# Patient Record
Sex: FEMALE | Race: BLACK OR AFRICAN AMERICAN | NOT HISPANIC OR LATINO | Employment: OTHER | URBAN - METROPOLITAN AREA
[De-identification: names, ages, dates, MRNs, and addresses within clinical notes are randomized per-mention and may not be internally consistent; named-entity substitution may affect disease eponyms.]

---

## 2017-01-25 ENCOUNTER — HISTORICAL (OUTPATIENT)
Dept: LAB | Facility: HOSPITAL | Age: 77
End: 2017-01-25

## 2017-01-25 ENCOUNTER — HISTORICAL (OUTPATIENT)
Dept: PREADMISSION TESTING | Facility: HOSPITAL | Age: 77
End: 2017-01-25

## 2018-05-16 ENCOUNTER — HISTORICAL (OUTPATIENT)
Dept: RADIOLOGY | Facility: HOSPITAL | Age: 78
End: 2018-05-16

## 2018-05-16 LAB
ALBUMIN SERPL-MCNC: 3.6 GM/DL (ref 3.4–5)
ALBUMIN/GLOB SERPL: 0.9 RATIO (ref 1.1–2)
ALP SERPL-CCNC: 50 UNIT/L (ref 38–126)
ALT SERPL-CCNC: 16 UNIT/L (ref 12–78)
AST SERPL-CCNC: 16 UNIT/L (ref 15–37)
BILIRUB SERPL-MCNC: 0.6 MG/DL (ref 0.2–1)
BILIRUBIN DIRECT+TOT PNL SERPL-MCNC: 0.1 MG/DL (ref 0–0.5)
BILIRUBIN DIRECT+TOT PNL SERPL-MCNC: 0.5 MG/DL (ref 0–0.8)
BUN SERPL-MCNC: 19 MG/DL (ref 7–18)
CALCIUM SERPL-MCNC: 9.7 MG/DL (ref 8.5–10.1)
CHLORIDE SERPL-SCNC: 108 MMOL/L (ref 98–107)
CO2 SERPL-SCNC: 27 MMOL/L (ref 21–32)
CREAT SERPL-MCNC: 1.27 MG/DL (ref 0.55–1.02)
GLOBULIN SER-MCNC: 3.8 GM/DL (ref 2.4–3.5)
GLUCOSE SERPL-MCNC: 83 MG/DL (ref 74–106)
PHOSPHATE SERPL-MCNC: 2.9 MG/DL (ref 2.5–4.9)
POTASSIUM SERPL-SCNC: 4.2 MMOL/L (ref 3.5–5.1)
PROT SERPL-MCNC: 7.4 GM/DL (ref 6.4–8.2)
SODIUM SERPL-SCNC: 141 MMOL/L (ref 136–145)
URATE SERPL-MCNC: 7.7 MG/DL (ref 2.6–7.2)

## 2018-11-30 ENCOUNTER — HISTORICAL (OUTPATIENT)
Dept: ADMINISTRATIVE | Facility: HOSPITAL | Age: 78
End: 2018-11-30

## 2018-11-30 LAB
ALBUMIN SERPL-MCNC: 3.3 GM/DL (ref 3.4–5)
ALBUMIN/GLOB SERPL: 0.9 {RATIO}
ALP SERPL-CCNC: 53 UNIT/L (ref 38–126)
ALT SERPL-CCNC: 25 UNIT/L (ref 12–78)
AST SERPL-CCNC: 22 UNIT/L (ref 15–37)
BILIRUB SERPL-MCNC: 0.4 MG/DL (ref 0.2–1)
BILIRUBIN DIRECT+TOT PNL SERPL-MCNC: 0.1 MG/DL (ref 0–0.2)
BILIRUBIN DIRECT+TOT PNL SERPL-MCNC: 0.3 MG/DL (ref 0–0.8)
BUN SERPL-MCNC: 20 MG/DL (ref 7–18)
CALCIUM SERPL-MCNC: 9.4 MG/DL (ref 8.5–10.1)
CHLORIDE SERPL-SCNC: 107 MMOL/L (ref 98–107)
CO2 SERPL-SCNC: 25 MMOL/L (ref 21–32)
CREAT SERPL-MCNC: 1.4 MG/DL (ref 0.55–1.02)
GLOBULIN SER-MCNC: 3.6 GM/DL (ref 2.4–3.5)
GLUCOSE SERPL-MCNC: 95 MG/DL (ref 74–106)
POTASSIUM SERPL-SCNC: 3.7 MMOL/L (ref 3.5–5.1)
PROT SERPL-MCNC: 6.9 GM/DL (ref 6.4–8.2)
SODIUM SERPL-SCNC: 140 MMOL/L (ref 136–145)

## 2019-03-25 ENCOUNTER — HISTORICAL (OUTPATIENT)
Dept: ADMINISTRATIVE | Facility: HOSPITAL | Age: 79
End: 2019-03-25

## 2019-03-25 LAB
ALBUMIN SERPL-MCNC: 3.4 GM/DL (ref 3.4–5)
ALBUMIN/GLOB SERPL: 0.9 RATIO (ref 1.1–2)
ALP SERPL-CCNC: 57 UNIT/L (ref 38–126)
ALT SERPL-CCNC: 15 UNIT/L (ref 12–78)
AST SERPL-CCNC: 15 UNIT/L (ref 15–37)
BILIRUB SERPL-MCNC: 0.4 MG/DL (ref 0.2–1)
BILIRUBIN DIRECT+TOT PNL SERPL-MCNC: 0.2 MG/DL (ref 0–0.5)
BILIRUBIN DIRECT+TOT PNL SERPL-MCNC: 0.2 MG/DL (ref 0–0.8)
BUN SERPL-MCNC: 14 MG/DL (ref 7–18)
CALCIUM SERPL-MCNC: 9.6 MG/DL (ref 8.5–10.1)
CHLORIDE SERPL-SCNC: 109 MMOL/L (ref 98–107)
CO2 SERPL-SCNC: 27 MMOL/L (ref 21–32)
CREAT SERPL-MCNC: 1.31 MG/DL (ref 0.55–1.02)
GLOBULIN SER-MCNC: 3.7 GM/DL (ref 2.4–3.5)
GLUCOSE SERPL-MCNC: 98 MG/DL (ref 74–106)
POTASSIUM SERPL-SCNC: 4 MMOL/L (ref 3.5–5.1)
PROT SERPL-MCNC: 7.1 GM/DL (ref 6.4–8.2)
SODIUM SERPL-SCNC: 141 MMOL/L (ref 136–145)

## 2019-10-03 ENCOUNTER — HISTORICAL (OUTPATIENT)
Dept: ADMINISTRATIVE | Facility: HOSPITAL | Age: 79
End: 2019-10-03

## 2019-10-03 LAB
ALBUMIN SERPL-MCNC: 3.6 GM/DL (ref 3.4–5)
ALBUMIN/GLOB SERPL: 1 RATIO (ref 1.1–2)
ALP SERPL-CCNC: 53 UNIT/L (ref 38–126)
ALT SERPL-CCNC: 12 UNIT/L (ref 12–78)
AST SERPL-CCNC: 18 UNIT/L (ref 15–37)
BILIRUB SERPL-MCNC: 0.6 MG/DL (ref 0.2–1)
BILIRUBIN DIRECT+TOT PNL SERPL-MCNC: 0.2 MG/DL (ref 0–0.5)
BILIRUBIN DIRECT+TOT PNL SERPL-MCNC: 0.4 MG/DL (ref 0–0.8)
BUN SERPL-MCNC: 16 MG/DL (ref 7–18)
CALCIUM SERPL-MCNC: 9.6 MG/DL (ref 8.5–10.1)
CHLORIDE SERPL-SCNC: 107 MMOL/L (ref 98–107)
CO2 SERPL-SCNC: 28 MMOL/L (ref 21–32)
CREAT SERPL-MCNC: 1.45 MG/DL (ref 0.55–1.02)
GLOBULIN SER-MCNC: 3.7 GM/DL (ref 2.4–3.5)
GLUCOSE SERPL-MCNC: 94 MG/DL (ref 74–106)
POTASSIUM SERPL-SCNC: 3.7 MMOL/L (ref 3.5–5.1)
PROT SERPL-MCNC: 7.3 GM/DL (ref 6.4–8.2)
SODIUM SERPL-SCNC: 140 MMOL/L (ref 136–145)

## 2020-04-16 ENCOUNTER — HISTORICAL (OUTPATIENT)
Dept: ADMINISTRATIVE | Facility: HOSPITAL | Age: 80
End: 2020-04-16

## 2020-04-16 LAB
ALBUMIN SERPL-MCNC: 3.7 GM/DL (ref 3.4–4.8)
ALBUMIN/GLOB SERPL: 1.1 RATIO (ref 1.1–2)
ALP SERPL-CCNC: 51 UNIT/L (ref 40–150)
ALT SERPL-CCNC: 8 UNIT/L (ref 0–55)
AST SERPL-CCNC: 15 UNIT/L (ref 5–34)
BILIRUB SERPL-MCNC: 0.6 MG/DL
BILIRUBIN DIRECT+TOT PNL SERPL-MCNC: 0.2 MG/DL (ref 0–0.5)
BILIRUBIN DIRECT+TOT PNL SERPL-MCNC: 0.4 MG/DL (ref 0–0.8)
BUN SERPL-MCNC: 16 MG/DL (ref 9.8–20.1)
CALCIUM SERPL-MCNC: 9.7 MG/DL (ref 8.4–10.2)
CHLORIDE SERPL-SCNC: 109 MMOL/L (ref 98–107)
CO2 SERPL-SCNC: 26 MMOL/L (ref 23–31)
CREAT SERPL-MCNC: 1.18 MG/DL (ref 0.55–1.02)
GLOBULIN SER-MCNC: 3.5 GM/DL (ref 2.4–3.5)
GLUCOSE SERPL-MCNC: 93 MG/DL (ref 82–115)
POTASSIUM SERPL-SCNC: 4.2 MMOL/L (ref 3.5–5.1)
PROT SERPL-MCNC: 7.2 GM/DL (ref 5.8–7.6)
SODIUM SERPL-SCNC: 141 MMOL/L (ref 136–145)

## 2020-10-16 ENCOUNTER — HISTORICAL (OUTPATIENT)
Dept: ADMINISTRATIVE | Facility: HOSPITAL | Age: 80
End: 2020-10-16

## 2020-10-16 LAB
ALBUMIN SERPL-MCNC: 3.9 GM/DL (ref 3.4–4.8)
ALBUMIN/GLOB SERPL: 1.3 RATIO (ref 1.1–2)
ALP SERPL-CCNC: 50 UNIT/L (ref 40–150)
ALT SERPL-CCNC: 9 UNIT/L (ref 0–55)
AST SERPL-CCNC: 16 UNIT/L (ref 5–34)
BILIRUB SERPL-MCNC: 0.5 MG/DL
BILIRUBIN DIRECT+TOT PNL SERPL-MCNC: 0.2 MG/DL (ref 0–0.5)
BILIRUBIN DIRECT+TOT PNL SERPL-MCNC: 0.3 MG/DL (ref 0–0.8)
BUN SERPL-MCNC: 11.1 MG/DL (ref 9.8–20.1)
CALCIUM SERPL-MCNC: 9.4 MG/DL (ref 8.4–10.2)
CHLORIDE SERPL-SCNC: 102 MMOL/L (ref 98–107)
CO2 SERPL-SCNC: 25 MMOL/L (ref 23–31)
CREAT SERPL-MCNC: 1.16 MG/DL (ref 0.55–1.02)
GLOBULIN SER-MCNC: 3.1 GM/DL (ref 2.4–3.5)
GLUCOSE SERPL-MCNC: 96 MG/DL (ref 82–115)
POTASSIUM SERPL-SCNC: 3.7 MMOL/L (ref 3.5–5.1)
PROT SERPL-MCNC: 7 GM/DL (ref 5.8–7.6)
SODIUM SERPL-SCNC: 137 MMOL/L (ref 136–145)

## 2021-04-15 ENCOUNTER — HISTORICAL (OUTPATIENT)
Dept: ADMINISTRATIVE | Facility: HOSPITAL | Age: 81
End: 2021-04-15

## 2021-04-15 LAB
ALBUMIN SERPL-MCNC: 4 GM/DL (ref 3.4–4.8)
ALBUMIN/GLOB SERPL: 1.2 RATIO (ref 1.1–2)
ALP SERPL-CCNC: 51 UNIT/L (ref 40–150)
ALT SERPL-CCNC: 5 UNIT/L (ref 0–55)
AST SERPL-CCNC: 18 UNIT/L (ref 5–34)
BILIRUB SERPL-MCNC: 0.6 MG/DL
BILIRUBIN DIRECT+TOT PNL SERPL-MCNC: 0.2 MG/DL (ref 0–0.5)
BILIRUBIN DIRECT+TOT PNL SERPL-MCNC: 0.4 MG/DL (ref 0–0.8)
BUN SERPL-MCNC: 12.6 MG/DL (ref 9.8–20.1)
CALCIUM SERPL-MCNC: 10.3 MG/DL (ref 8.4–10.2)
CHLORIDE SERPL-SCNC: 106 MMOL/L (ref 98–107)
CO2 SERPL-SCNC: 25 MMOL/L (ref 23–31)
CREAT SERPL-MCNC: 1.22 MG/DL (ref 0.55–1.02)
GLOBULIN SER-MCNC: 3.3 GM/DL (ref 2.4–3.5)
GLUCOSE SERPL-MCNC: 89 MG/DL (ref 82–115)
POTASSIUM SERPL-SCNC: 4.2 MMOL/L (ref 3.5–5.1)
PROT SERPL-MCNC: 7.3 GM/DL (ref 5.8–7.6)
SODIUM SERPL-SCNC: 143 MMOL/L (ref 136–145)

## 2021-10-14 ENCOUNTER — HISTORICAL (OUTPATIENT)
Dept: ADMINISTRATIVE | Facility: HOSPITAL | Age: 81
End: 2021-10-14

## 2021-10-14 LAB
ALBUMIN SERPL-MCNC: 3.5 GM/DL (ref 3.4–4.8)
ALBUMIN/GLOB SERPL: 1 RATIO (ref 1.1–2)
ALP SERPL-CCNC: 50 UNIT/L (ref 40–150)
ALT SERPL-CCNC: 7 UNIT/L (ref 0–55)
AST SERPL-CCNC: 15 UNIT/L (ref 5–34)
BILIRUB SERPL-MCNC: 0.5 MG/DL
BILIRUBIN DIRECT+TOT PNL SERPL-MCNC: 0.2 MG/DL (ref 0–0.5)
BILIRUBIN DIRECT+TOT PNL SERPL-MCNC: 0.3 MG/DL (ref 0–0.8)
BUN SERPL-MCNC: 15 MG/DL (ref 9.8–20.1)
CALCIUM SERPL-MCNC: 10.1 MG/DL (ref 8.4–10.2)
CHLORIDE SERPL-SCNC: 107 MMOL/L (ref 98–107)
CO2 SERPL-SCNC: 24 MMOL/L (ref 23–31)
CREAT SERPL-MCNC: 1.28 MG/DL (ref 0.55–1.02)
GLOBULIN SER-MCNC: 3.4 GM/DL (ref 2.4–3.5)
GLUCOSE SERPL-MCNC: 98 MG/DL (ref 82–115)
POTASSIUM SERPL-SCNC: 3.8 MMOL/L (ref 3.5–5.1)
PROT SERPL-MCNC: 6.9 GM/DL (ref 5.8–7.6)
SODIUM SERPL-SCNC: 139 MMOL/L (ref 136–145)

## 2022-04-13 ENCOUNTER — HISTORICAL (OUTPATIENT)
Dept: ADMINISTRATIVE | Facility: HOSPITAL | Age: 82
End: 2022-04-13

## 2022-04-13 LAB
ALBUMIN SERPL-MCNC: 3.6 G/DL (ref 3.4–4.8)
ALBUMIN/GLOB SERPL: 1.1 {RATIO} (ref 1.1–2)
ALP SERPL-CCNC: 53 U/L (ref 40–150)
ALT SERPL-CCNC: 8 U/L (ref 0–55)
AST SERPL-CCNC: 18 U/L (ref 5–34)
BILIRUB SERPL-MCNC: 0.4 MG/DL
BILIRUBIN DIRECT+TOT PNL SERPL-MCNC: 0.2 (ref 0–0.5)
BILIRUBIN DIRECT+TOT PNL SERPL-MCNC: 0.2 (ref 0–0.8)
BUN SERPL-MCNC: 10.7 MG/DL (ref 9.8–20.1)
CALCIUM SERPL-MCNC: 9.9 MG/DL (ref 8.7–10.5)
CHLORIDE SERPL-SCNC: 105 MMOL/L (ref 98–107)
CO2 SERPL-SCNC: 26 MMOL/L (ref 23–31)
CREAT SERPL-MCNC: 1.28 MG/DL (ref 0.55–1.02)
GLOBULIN SER-MCNC: 3.3 G/DL (ref 2.4–3.5)
GLUCOSE SERPL-MCNC: 88 MG/DL (ref 82–115)
HEMOLYSIS INTERF INDEX SERPL-ACNC: 1
ICTERIC INTERF INDEX SERPL-ACNC: 0
LIPEMIC INTERF INDEX SERPL-ACNC: <0
POTASSIUM SERPL-SCNC: 4.1 MMOL/L (ref 3.5–5.1)
PROT SERPL-MCNC: 6.9 G/DL (ref 5.8–7.6)
SODIUM SERPL-SCNC: 140 MMOL/L (ref 136–145)

## 2022-10-12 ENCOUNTER — LAB VISIT (OUTPATIENT)
Dept: LAB | Facility: HOSPITAL | Age: 82
End: 2022-10-12
Attending: INTERNAL MEDICINE
Payer: MEDICARE

## 2022-10-12 DIAGNOSIS — Z90.5 ACQUIRED ABSENCE OF KIDNEY: ICD-10-CM

## 2022-10-12 DIAGNOSIS — E78.2 MIXED HYPERLIPIDEMIA: ICD-10-CM

## 2022-10-12 DIAGNOSIS — N18.31 CHRONIC KIDNEY DISEASE (CKD) STAGE G3A/A1, MODERATELY DECREASED GLOMERULAR FILTRATION RATE (GFR) BETWEEN 45-59 ML/MIN/1.73 SQUARE METER AND ALBUMINURIA CREATININE RATIO LESS THAN 30 MG/G: Primary | ICD-10-CM

## 2022-10-12 LAB
ALBUMIN SERPL-MCNC: 3.9 GM/DL (ref 3.4–4.8)
ALBUMIN/GLOB SERPL: 1.2 RATIO (ref 1.1–2)
ALP SERPL-CCNC: 52 UNIT/L (ref 40–150)
ALT SERPL-CCNC: 10 UNIT/L (ref 0–55)
AST SERPL-CCNC: 21 UNIT/L (ref 5–34)
BILIRUBIN DIRECT+TOT PNL SERPL-MCNC: 0.6 MG/DL
BUN SERPL-MCNC: 14.8 MG/DL (ref 9.8–20.1)
CALCIUM SERPL-MCNC: 10 MG/DL (ref 8.4–10.2)
CHLORIDE SERPL-SCNC: 105 MMOL/L (ref 98–107)
CO2 SERPL-SCNC: 25 MMOL/L (ref 23–31)
CREAT SERPL-MCNC: 1.2 MG/DL (ref 0.55–1.02)
GFR SERPLBLD CREATININE-BSD FMLA CKD-EPI: 46 MLS/MIN/1.73/M2
GLOBULIN SER-MCNC: 3.3 GM/DL (ref 2.4–3.5)
GLUCOSE SERPL-MCNC: 103 MG/DL (ref 82–115)
POTASSIUM SERPL-SCNC: 4 MMOL/L (ref 3.5–5.1)
PROT SERPL-MCNC: 7.2 GM/DL (ref 5.8–7.6)
SODIUM SERPL-SCNC: 136 MMOL/L (ref 136–145)

## 2022-10-12 PROCEDURE — 36415 COLL VENOUS BLD VENIPUNCTURE: CPT

## 2022-10-12 PROCEDURE — 80053 COMPREHEN METABOLIC PANEL: CPT

## 2023-04-13 ENCOUNTER — LAB VISIT (OUTPATIENT)
Dept: LAB | Facility: HOSPITAL | Age: 83
End: 2023-04-13
Attending: INTERNAL MEDICINE
Payer: MEDICARE

## 2023-04-13 DIAGNOSIS — Z90.5 HISTORY OF NEPHRECTOMY, UNILATERAL: ICD-10-CM

## 2023-04-13 DIAGNOSIS — E78.2 MIXED HYPERLIPIDEMIA: ICD-10-CM

## 2023-04-13 DIAGNOSIS — I10 ESSENTIAL HYPERTENSION, MALIGNANT: ICD-10-CM

## 2023-04-13 DIAGNOSIS — N18.31 CHRONIC KIDNEY DISEASE (CKD) STAGE G3A/A1, MODERATELY DECREASED GLOMERULAR FILTRATION RATE (GFR) BETWEEN 45-59 ML/MIN/1.73 SQUARE METER AND ALBUMINURIA CREATININE RATIO LESS THAN 30 MG/G: Primary | ICD-10-CM

## 2023-04-13 LAB
ALBUMIN SERPL-MCNC: 3.8 G/DL (ref 3.4–4.8)
ALBUMIN/GLOB SERPL: 1.2 RATIO (ref 1.1–2)
ALP SERPL-CCNC: 49 UNIT/L (ref 40–150)
ALT SERPL-CCNC: 10 UNIT/L (ref 0–55)
AST SERPL-CCNC: 19 UNIT/L (ref 5–34)
BILIRUBIN DIRECT+TOT PNL SERPL-MCNC: 0.4 MG/DL
BUN SERPL-MCNC: 14.8 MG/DL (ref 9.8–20.1)
CALCIUM SERPL-MCNC: 10.1 MG/DL (ref 8.4–10.2)
CHLORIDE SERPL-SCNC: 104 MMOL/L (ref 98–107)
CO2 SERPL-SCNC: 25 MMOL/L (ref 23–31)
CREAT SERPL-MCNC: 1.26 MG/DL (ref 0.55–1.02)
GFR SERPLBLD CREATININE-BSD FMLA CKD-EPI: 43 MLS/MIN/1.73/M2
GLOBULIN SER-MCNC: 3.3 GM/DL (ref 2.4–3.5)
GLUCOSE SERPL-MCNC: 103 MG/DL (ref 82–115)
POTASSIUM SERPL-SCNC: 4.3 MMOL/L (ref 3.5–5.1)
PROT SERPL-MCNC: 7.1 GM/DL (ref 5.8–7.6)
SODIUM SERPL-SCNC: 137 MMOL/L (ref 136–145)

## 2023-04-13 PROCEDURE — 80053 COMPREHEN METABOLIC PANEL: CPT

## 2023-04-13 PROCEDURE — 36415 COLL VENOUS BLD VENIPUNCTURE: CPT

## 2023-10-17 ENCOUNTER — LAB REQUISITION (OUTPATIENT)
Dept: LAB | Facility: HOSPITAL | Age: 83
End: 2023-10-17
Payer: MEDICARE

## 2023-10-17 ENCOUNTER — LAB VISIT (OUTPATIENT)
Dept: LAB | Facility: HOSPITAL | Age: 83
End: 2023-10-17
Attending: INTERNAL MEDICINE
Payer: MEDICARE

## 2023-10-17 DIAGNOSIS — E78.2 MIXED HYPERLIPIDEMIA: ICD-10-CM

## 2023-10-17 DIAGNOSIS — Z90.5 ACQUIRED ABSENCE OF KIDNEY: ICD-10-CM

## 2023-10-17 DIAGNOSIS — I10 ESSENTIAL (PRIMARY) HYPERTENSION: ICD-10-CM

## 2023-10-17 DIAGNOSIS — Z90.5 HISTORY OF NEPHRECTOMY: ICD-10-CM

## 2023-10-17 DIAGNOSIS — I10 HYPERTENSION, UNSPECIFIED TYPE: ICD-10-CM

## 2023-10-17 DIAGNOSIS — N18.31 CHRONIC KIDNEY DISEASE (CKD) STAGE G3A/A1, MODERATELY DECREASED GLOMERULAR FILTRATION RATE (GFR) BETWEEN 45-59 ML/MIN/1.73 SQUARE METER AND ALBUMINURIA CREATININE RATIO LESS THAN 30 MG/G: Primary | ICD-10-CM

## 2023-10-17 DIAGNOSIS — N18.31 CHRONIC KIDNEY DISEASE, STAGE 3A: ICD-10-CM

## 2023-10-17 LAB
ALBUMIN SERPL-MCNC: 3.5 G/DL (ref 3.4–4.8)
ALBUMIN/GLOB SERPL: 1.1 RATIO (ref 1.1–2)
ALP SERPL-CCNC: 48 UNIT/L (ref 40–150)
ALT SERPL-CCNC: <5 UNIT/L (ref 0–55)
AST SERPL-CCNC: 15 UNIT/L (ref 5–34)
BILIRUB SERPL-MCNC: 0.5 MG/DL
BUN SERPL-MCNC: 12 MG/DL (ref 9.8–20.1)
CALCIUM SERPL-MCNC: 9.4 MG/DL (ref 8.4–10.2)
CHLORIDE SERPL-SCNC: 105 MMOL/L (ref 98–107)
CO2 SERPL-SCNC: 25 MMOL/L (ref 23–31)
CREAT SERPL-MCNC: 1.2 MG/DL (ref 0.55–1.02)
CREAT UR-MCNC: 53.4 MG/DL (ref 45–106)
GFR SERPLBLD CREATININE-BSD FMLA CKD-EPI: 45 MLS/MIN/1.73/M2
GLOBULIN SER-MCNC: 3.1 GM/DL (ref 2.4–3.5)
GLUCOSE SERPL-MCNC: 93 MG/DL (ref 82–115)
POTASSIUM SERPL-SCNC: 3.4 MMOL/L (ref 3.5–5.1)
PROT SERPL-MCNC: 6.6 GM/DL (ref 5.8–7.6)
PROT UR STRIP-MCNC: <6.8 MG/DL
SODIUM SERPL-SCNC: 137 MMOL/L (ref 136–145)
URATE SERPL-MCNC: 7.2 MG/DL (ref 2.6–6)

## 2023-10-17 PROCEDURE — 84550 ASSAY OF BLOOD/URIC ACID: CPT

## 2023-10-17 PROCEDURE — 36415 COLL VENOUS BLD VENIPUNCTURE: CPT

## 2023-10-17 PROCEDURE — 82570 ASSAY OF URINE CREATININE: CPT | Performed by: INTERNAL MEDICINE

## 2023-10-17 PROCEDURE — 80053 COMPREHEN METABOLIC PANEL: CPT

## 2024-04-17 ENCOUNTER — LAB VISIT (OUTPATIENT)
Dept: LAB | Facility: HOSPITAL | Age: 84
End: 2024-04-17
Attending: INTERNAL MEDICINE
Payer: MEDICARE

## 2024-04-17 DIAGNOSIS — E78.2 MIXED HYPERLIPIDEMIA: ICD-10-CM

## 2024-04-17 DIAGNOSIS — I10 ESSENTIAL HYPERTENSION, MALIGNANT: ICD-10-CM

## 2024-04-17 DIAGNOSIS — Z90.5 ACQUIRED ABSENCE OF KIDNEY: ICD-10-CM

## 2024-04-17 DIAGNOSIS — N18.31 CHRONIC KIDNEY DISEASE (CKD) STAGE G3A/A1, MODERATELY DECREASED GLOMERULAR FILTRATION RATE (GFR) BETWEEN 45-59 ML/MIN/1.73 SQUARE METER AND ALBUMINURIA CREATININE RATIO LESS THAN 30 MG/G: Primary | ICD-10-CM

## 2024-04-17 LAB
ALBUMIN SERPL-MCNC: 3.5 G/DL (ref 3.4–4.8)
ALBUMIN/GLOB SERPL: 1 RATIO (ref 1.1–2)
ALP SERPL-CCNC: 64 UNIT/L (ref 40–150)
ALT SERPL-CCNC: 7 UNIT/L (ref 0–55)
AST SERPL-CCNC: 16 UNIT/L (ref 5–34)
BILIRUB SERPL-MCNC: 0.6 MG/DL
BUN SERPL-MCNC: 13.2 MG/DL (ref 9.8–20.1)
CALCIUM SERPL-MCNC: 9.6 MG/DL (ref 8.4–10.2)
CHLORIDE SERPL-SCNC: 107 MMOL/L (ref 98–107)
CO2 SERPL-SCNC: 24 MMOL/L (ref 23–31)
CREAT SERPL-MCNC: 1.13 MG/DL (ref 0.55–1.02)
GFR SERPLBLD CREATININE-BSD FMLA CKD-EPI: 48 MLS/MIN/1.73/M2
GLOBULIN SER-MCNC: 3.4 GM/DL (ref 2.4–3.5)
GLUCOSE SERPL-MCNC: 95 MG/DL (ref 82–115)
MAGNESIUM SERPL-MCNC: 2.1 MG/DL (ref 1.6–2.6)
POTASSIUM SERPL-SCNC: 4 MMOL/L (ref 3.5–5.1)
PROT SERPL-MCNC: 6.9 GM/DL (ref 5.8–7.6)
SODIUM SERPL-SCNC: 138 MMOL/L (ref 136–145)
URATE SERPL-MCNC: 6.5 MG/DL (ref 2.6–6)

## 2024-04-17 PROCEDURE — 80053 COMPREHEN METABOLIC PANEL: CPT

## 2024-04-17 PROCEDURE — 36415 COLL VENOUS BLD VENIPUNCTURE: CPT

## 2024-04-17 PROCEDURE — 84550 ASSAY OF BLOOD/URIC ACID: CPT

## 2024-04-17 PROCEDURE — 83735 ASSAY OF MAGNESIUM: CPT

## 2024-09-05 ENCOUNTER — HOSPITAL ENCOUNTER (INPATIENT)
Facility: HOSPITAL | Age: 84
LOS: 3 days | Discharge: HOME | DRG: 640 | End: 2024-09-08
Attending: STUDENT IN AN ORGANIZED HEALTH CARE EDUCATION/TRAINING PROGRAM | Admitting: INTERNAL MEDICINE
Payer: MEDICARE

## 2024-09-05 DIAGNOSIS — R55 SYNCOPE, UNSPECIFIED SYNCOPE TYPE: ICD-10-CM

## 2024-09-05 DIAGNOSIS — I48.91 NEW ONSET A-FIB: Primary | ICD-10-CM

## 2024-09-05 DIAGNOSIS — R05.9 COUGH: ICD-10-CM

## 2024-09-05 DIAGNOSIS — R53.1 WEAKNESS: ICD-10-CM

## 2024-09-05 DIAGNOSIS — R55 SYNCOPE: ICD-10-CM

## 2024-09-05 DIAGNOSIS — I48.91 ATRIAL FIBRILLATION: ICD-10-CM

## 2024-09-05 DIAGNOSIS — U07.1 COVID-19: ICD-10-CM

## 2024-09-05 LAB
ALBUMIN SERPL-MCNC: 3.4 G/DL (ref 3.4–4.8)
ALBUMIN/GLOB SERPL: 0.9 RATIO (ref 1.1–2)
ALP SERPL-CCNC: 48 UNIT/L (ref 40–150)
ALT SERPL-CCNC: 7 UNIT/L (ref 0–55)
ANION GAP SERPL CALC-SCNC: 8 MEQ/L
APTT PPP: 28.5 SECONDS (ref 23.4–33.9)
AST SERPL-CCNC: 17 UNIT/L (ref 5–34)
BACTERIA #/AREA URNS AUTO: ABNORMAL /HPF
BASOPHILS # BLD AUTO: 0.04 X10(3)/MCL
BASOPHILS NFR BLD AUTO: 0.7 %
BILIRUB SERPL-MCNC: 0.4 MG/DL
BILIRUB UR QL STRIP.AUTO: NEGATIVE
BUN SERPL-MCNC: 19.7 MG/DL (ref 9.8–20.1)
CALCIUM SERPL-MCNC: 9.5 MG/DL (ref 8.4–10.2)
CHLORIDE SERPL-SCNC: 101 MMOL/L (ref 98–107)
CLARITY UR: CLEAR
CO2 SERPL-SCNC: 23 MMOL/L (ref 23–31)
COLOR UR AUTO: ABNORMAL
CREAT SERPL-MCNC: 1.56 MG/DL (ref 0.55–1.02)
CREAT/UREA NIT SERPL: 13
EOSINOPHIL # BLD AUTO: 0.08 X10(3)/MCL (ref 0–0.9)
EOSINOPHIL NFR BLD AUTO: 1.3 %
ERYTHROCYTE [DISTWIDTH] IN BLOOD BY AUTOMATED COUNT: 13.7 % (ref 11.5–17)
EST. AVERAGE GLUCOSE BLD GHB EST-MCNC: 114 MG/DL
FLUAV AG UPPER RESP QL IA.RAPID: NOT DETECTED
FLUBV AG UPPER RESP QL IA.RAPID: NOT DETECTED
GFR SERPLBLD CREATININE-BSD FMLA CKD-EPI: 33 ML/MIN/1.73/M2
GLOBULIN SER-MCNC: 3.6 GM/DL (ref 2.4–3.5)
GLUCOSE SERPL-MCNC: 123 MG/DL (ref 82–115)
GLUCOSE UR QL STRIP: NEGATIVE
HBA1C MFR BLD: 5.6 %
HCT VFR BLD AUTO: 39.3 % (ref 37–47)
HGB BLD-MCNC: 13.3 G/DL (ref 12–16)
HGB UR QL STRIP: NEGATIVE
IMM GRANULOCYTES # BLD AUTO: 0.04 X10(3)/MCL (ref 0–0.04)
IMM GRANULOCYTES NFR BLD AUTO: 0.7 %
INR PPP: 1 (ref 2–3)
KETONES UR QL STRIP: NEGATIVE
LEUKOCYTE ESTERASE UR QL STRIP: ABNORMAL
LYMPHOCYTES # BLD AUTO: 1.79 X10(3)/MCL (ref 0.6–4.6)
LYMPHOCYTES NFR BLD AUTO: 30.1 %
MAGNESIUM SERPL-MCNC: 2 MG/DL (ref 1.6–2.6)
MCH RBC QN AUTO: 29.6 PG (ref 27–31)
MCHC RBC AUTO-ENTMCNC: 33.8 G/DL (ref 33–36)
MCV RBC AUTO: 87.3 FL (ref 80–94)
MONOCYTES # BLD AUTO: 0.69 X10(3)/MCL (ref 0.1–1.3)
MONOCYTES NFR BLD AUTO: 11.6 %
NEUTROPHILS # BLD AUTO: 3.3 X10(3)/MCL (ref 2.1–9.2)
NEUTROPHILS NFR BLD AUTO: 55.6 %
NITRITE UR QL STRIP: NEGATIVE
NRBC BLD AUTO-RTO: 0 %
OHS QRS DURATION: 68 MS
OHS QTC CALCULATION: 440 MS
PH UR STRIP: 6.5 [PH]
PLATELET # BLD AUTO: 225 X10(3)/MCL (ref 130–400)
PMV BLD AUTO: 9.6 FL (ref 7.4–10.4)
POTASSIUM SERPL-SCNC: 3.4 MMOL/L (ref 3.5–5.1)
PROT SERPL-MCNC: 7 GM/DL (ref 5.8–7.6)
PROT UR QL STRIP: NEGATIVE
PROTHROMBIN TIME: 13.7 SECONDS (ref 11.7–14.5)
RBC # BLD AUTO: 4.5 X10(6)/MCL (ref 4.2–5.4)
RBC #/AREA URNS AUTO: ABNORMAL /HPF
SARS-COV-2 RNA RESP QL NAA+PROBE: DETECTED
SODIUM SERPL-SCNC: 132 MMOL/L (ref 136–145)
SP GR UR STRIP.AUTO: <=1.005 (ref 1–1.03)
SQUAMOUS #/AREA URNS AUTO: ABNORMAL /HPF
TROPONIN I SERPL-MCNC: 0.02 NG/ML (ref 0–0.04)
TSH SERPL-ACNC: 3.22 UIU/ML (ref 0.35–4.94)
UROBILINOGEN UR STRIP-ACNC: 0.2
WBC # BLD AUTO: 5.94 X10(3)/MCL (ref 4.5–11.5)
WBC #/AREA URNS AUTO: ABNORMAL /HPF

## 2024-09-05 PROCEDURE — 80053 COMPREHEN METABOLIC PANEL: CPT | Performed by: STUDENT IN AN ORGANIZED HEALTH CARE EDUCATION/TRAINING PROGRAM

## 2024-09-05 PROCEDURE — 27000207 HC ISOLATION

## 2024-09-05 PROCEDURE — 21400001 HC TELEMETRY ROOM

## 2024-09-05 PROCEDURE — 85730 THROMBOPLASTIN TIME PARTIAL: CPT | Performed by: STUDENT IN AN ORGANIZED HEALTH CARE EDUCATION/TRAINING PROGRAM

## 2024-09-05 PROCEDURE — 96360 HYDRATION IV INFUSION INIT: CPT

## 2024-09-05 PROCEDURE — 93010 ELECTROCARDIOGRAM REPORT: CPT | Mod: ,,, | Performed by: INTERNAL MEDICINE

## 2024-09-05 PROCEDURE — 84484 ASSAY OF TROPONIN QUANT: CPT | Performed by: STUDENT IN AN ORGANIZED HEALTH CARE EDUCATION/TRAINING PROGRAM

## 2024-09-05 PROCEDURE — 99285 EMERGENCY DEPT VISIT HI MDM: CPT | Mod: 25

## 2024-09-05 PROCEDURE — 0240U COVID/FLU A&B PCR: CPT | Performed by: STUDENT IN AN ORGANIZED HEALTH CARE EDUCATION/TRAINING PROGRAM

## 2024-09-05 PROCEDURE — 93005 ELECTROCARDIOGRAM TRACING: CPT

## 2024-09-05 PROCEDURE — 84443 ASSAY THYROID STIM HORMONE: CPT | Performed by: STUDENT IN AN ORGANIZED HEALTH CARE EDUCATION/TRAINING PROGRAM

## 2024-09-05 PROCEDURE — 81003 URINALYSIS AUTO W/O SCOPE: CPT | Performed by: STUDENT IN AN ORGANIZED HEALTH CARE EDUCATION/TRAINING PROGRAM

## 2024-09-05 PROCEDURE — 85025 COMPLETE CBC W/AUTO DIFF WBC: CPT | Performed by: STUDENT IN AN ORGANIZED HEALTH CARE EDUCATION/TRAINING PROGRAM

## 2024-09-05 PROCEDURE — 81001 URINALYSIS AUTO W/SCOPE: CPT | Performed by: STUDENT IN AN ORGANIZED HEALTH CARE EDUCATION/TRAINING PROGRAM

## 2024-09-05 PROCEDURE — 63600175 PHARM REV CODE 636 W HCPCS: Performed by: STUDENT IN AN ORGANIZED HEALTH CARE EDUCATION/TRAINING PROGRAM

## 2024-09-05 PROCEDURE — 83036 HEMOGLOBIN GLYCOSYLATED A1C: CPT | Performed by: STUDENT IN AN ORGANIZED HEALTH CARE EDUCATION/TRAINING PROGRAM

## 2024-09-05 PROCEDURE — 85610 PROTHROMBIN TIME: CPT | Performed by: STUDENT IN AN ORGANIZED HEALTH CARE EDUCATION/TRAINING PROGRAM

## 2024-09-05 PROCEDURE — 11000001 HC ACUTE MED/SURG PRIVATE ROOM

## 2024-09-05 PROCEDURE — 83735 ASSAY OF MAGNESIUM: CPT | Performed by: STUDENT IN AN ORGANIZED HEALTH CARE EDUCATION/TRAINING PROGRAM

## 2024-09-05 RX ORDER — LOVASTATIN 10 MG/1
10 TABLET ORAL
COMMUNITY

## 2024-09-05 RX ORDER — METOPROLOL TARTRATE 25 MG/1
12.5 TABLET ORAL 2 TIMES DAILY
Status: DISCONTINUED | OUTPATIENT
Start: 2024-09-06 | End: 2024-09-08 | Stop reason: HOSPADM

## 2024-09-05 RX ORDER — ONDANSETRON HYDROCHLORIDE 2 MG/ML
4 INJECTION, SOLUTION INTRAVENOUS EVERY 6 HOURS PRN
Status: DISCONTINUED | OUTPATIENT
Start: 2024-09-06 | End: 2024-09-08 | Stop reason: HOSPADM

## 2024-09-05 RX ORDER — MUPIROCIN 20 MG/G
OINTMENT TOPICAL 2 TIMES DAILY
Status: DISCONTINUED | OUTPATIENT
Start: 2024-09-06 | End: 2024-09-08 | Stop reason: HOSPADM

## 2024-09-05 RX ORDER — SODIUM CHLORIDE 0.9 % (FLUSH) 0.9 %
10 SYRINGE (ML) INJECTION
Status: DISCONTINUED | OUTPATIENT
Start: 2024-09-05 | End: 2024-09-08 | Stop reason: HOSPADM

## 2024-09-05 RX ORDER — ATORVASTATIN CALCIUM 10 MG/1
20 TABLET, FILM COATED ORAL DAILY
Status: DISCONTINUED | OUTPATIENT
Start: 2024-09-06 | End: 2024-09-06

## 2024-09-05 RX ORDER — LISINOPRIL AND HYDROCHLOROTHIAZIDE 12.5; 2 MG/1; MG/1
1 TABLET ORAL
Status: ON HOLD | COMMUNITY
End: 2024-09-08 | Stop reason: HOSPADM

## 2024-09-05 RX ORDER — POTASSIUM CHLORIDE 20 MEQ/1
40 TABLET, EXTENDED RELEASE ORAL ONCE
Status: COMPLETED | OUTPATIENT
Start: 2024-09-06 | End: 2024-09-05

## 2024-09-05 RX ORDER — ENOXAPARIN SODIUM 100 MG/ML
1 INJECTION SUBCUTANEOUS EVERY 24 HOURS
Status: DISCONTINUED | OUTPATIENT
Start: 2024-09-05 | End: 2024-09-08 | Stop reason: HOSPADM

## 2024-09-05 RX ORDER — SODIUM CHLORIDE 9 MG/ML
INJECTION, SOLUTION INTRAVENOUS CONTINUOUS
Status: DISCONTINUED | OUTPATIENT
Start: 2024-09-06 | End: 2024-09-08 | Stop reason: HOSPADM

## 2024-09-05 RX ORDER — ACETAMINOPHEN 325 MG/1
650 TABLET ORAL EVERY 6 HOURS PRN
Status: DISCONTINUED | OUTPATIENT
Start: 2024-09-06 | End: 2024-09-08 | Stop reason: HOSPADM

## 2024-09-05 RX ADMIN — ENOXAPARIN SODIUM 70 MG: 80 INJECTION SUBCUTANEOUS at 04:09

## 2024-09-05 RX ADMIN — SODIUM CHLORIDE, POTASSIUM CHLORIDE, SODIUM LACTATE AND CALCIUM CHLORIDE 1000 ML: 600; 310; 30; 20 INJECTION, SOLUTION INTRAVENOUS at 03:09

## 2024-09-05 RX ADMIN — POTASSIUM CHLORIDE 40 MEQ: 1500 TABLET, EXTENDED RELEASE ORAL at 11:09

## 2024-09-05 RX ADMIN — SODIUM CHLORIDE: 9 INJECTION, SOLUTION INTRAVENOUS at 11:09

## 2024-09-05 NOTE — Clinical Note
Diagnosis: Weakness [145022]   Future Attending Provider: GUILLERMO CONTRERAS [[id number]]   Admit to which facility:: [facility] [[id number]]   Reason for IP Medical Treatment  (Clinical interventions that can only be accomplished in the IP setting? ) :: IVF, cardiology consult   Plans for Post-Acute care--if anticipated (pick the single best option):: C. Discharge home with home health services

## 2024-09-05 NOTE — ED PROVIDER NOTES
Encounter Date: 9/5/2024       History     Chief Complaint   Patient presents with    Fatigue     c/o weakness and collapsing when going to the bathroom, and then vomiting . Witnessed fall by her sitter     HPI    83-year-old female with a past medical history of hypertension and CKD presents emergency department for weakness and fatigue.  Patient states that she has been having significant weakness today.  States she just does not feel well.  She states she stood up while going to the bathroom felt dizzy and passed out.  After that episode she vomited.  She states that while she was resting in bed currently she feels better.  Did not hit her head per caregiver.  Denies a cardiac history.  No chest pain or shortness of breath.    Review of patient's allergies indicates:  No Known Allergies  History reviewed. No pertinent past medical history.  History reviewed. No pertinent surgical history.  No family history on file.  Social History     Tobacco Use    Smoking status: Unknown     Review of Systems   Constitutional:  Positive for fatigue. Negative for fever.   Respiratory:  Negative for cough.    Cardiovascular:  Negative for chest pain.   Gastrointestinal:  Negative for abdominal pain, constipation, diarrhea, nausea and vomiting.   Neurological:  Negative for headaches.   All other systems reviewed and are negative.      Physical Exam     Initial Vitals   BP Pulse Resp Temp SpO2   09/05/24 1443 09/05/24 1443 09/05/24 1443 09/05/24 1443 09/05/24 1436   (!) 103/58 81 18 98.6 °F (37 °C) 100 %      MAP       --                Physical Exam    Nursing note and vitals reviewed.  Constitutional: She appears well-developed and well-nourished. No distress.   HENT:   Head: Atraumatic.   Cardiovascular:  Normal rate.           Irregularly irregular   Pulmonary/Chest: Breath sounds normal. No respiratory distress. She has no wheezes. She has no rhonchi. She has no rales.   Abdominal: Abdomen is soft. There is no abdominal  tenderness. There is no rebound and no guarding.   Musculoskeletal:         General: No tenderness. Normal range of motion.     Neurological: She is alert and oriented to person, place, and time. She has normal strength.   Mental status   Alert and attentive   Speech clear and fluent with normal comprehension   Able to provide clear account of historical and recent events   Oriented to person place and time   Able to follow 3 commands sequence (right thumb, touch left ear, stick out tongue)    Cranial nerves   2. Pupils equal round reactive to light bilaterally, right eye:  No visual defects, 4 quadrant my right finger confirmation.  Left eye:  No visual defects, 4 quadrant by finger confirmation   3. Bilateral eye adduction and elevation without diplopia    4. Bilateral eye adduction and depression without diplopia   6. Bilateral eye abduction with diplopia  5. Masseter muscle normal bulk and jaw opens symmetrically   7. Face symmetric during speech.  Wrinkle forehead and smile symmetrically intact, bilaterally   8.  Response to verbal stimulus   9. Normal speech without breathlessness or hoarseness   10. Normal speech without breathlessness or hoarseness  12. Tongue protrudes midline    Motor exam   Upper extremities: No pronator drift   Lower extremities:  Bilateral leg lift off bed to resistance, symmetric 5/5   Coordination:  No nystagmus, no saccadic pursuit on eye range of motion    Gait:  Not tested secondary to hypotension  Sensation: Deferred     Skin: Skin is warm. Capillary refill takes less than 2 seconds.         ED Course   Procedures  Labs Reviewed   COMPREHENSIVE METABOLIC PANEL - Abnormal       Result Value    Sodium 132 (*)     Potassium 3.4 (*)     Chloride 101      CO2 23      Glucose 123 (*)     Blood Urea Nitrogen 19.7      Creatinine 1.56 (*)     Calcium 9.5      Protein Total 7.0      Albumin 3.4      Globulin 3.6 (*)     Albumin/Globulin Ratio 0.9 (*)     Bilirubin Total 0.4      ALP 48       ALT 7      AST 17      eGFR 33      Anion Gap 8.0      BUN/Creatinine Ratio 13     COVID/FLU A&B PCR - Abnormal    Influenza A PCR Not Detected      Influenza B PCR Not Detected      SARS-CoV-2 PCR Detected (*)     Narrative:     The Xpert Xpress SARS-CoV-2/FLU/RSV plus is a rapid, multiplexed real-time PCR test intended for the simultaneous qualitative detection and differentiation of SARS-CoV-2, Influenza A, Influenza B, and respiratory syncytial virus (RSV) viral RNA in either nasopharyngeal swab or nasal swab specimens.         PROTIME-INR - Abnormal    PT 13.7      INR 1.0 (*)    APTT - Normal    PTT 28.5     MAGNESIUM - Normal    Magnesium Level 2.00     TROPONIN I - Normal    Troponin-I 0.020     TSH - Normal    TSH 3.219     CBC W/ AUTO DIFFERENTIAL    Narrative:     The following orders were created for panel order CBC auto differential.  Procedure                               Abnormality         Status                     ---------                               -----------         ------                     CBC with Differential[[id number]]                           Final result                 Please view results for these tests on the individual orders.   CBC WITH DIFFERENTIAL    WBC 5.94      RBC 4.50      Hgb 13.3      Hct 39.3      MCV 87.3      MCH 29.6      MCHC 33.8      RDW 13.7      Platelet 225      MPV 9.6      Neut % 55.6      Lymph % 30.1      Mono % 11.6      Eos % 1.3      Basophil % 0.7      Lymph # 1.79      Neut # 3.30      Mono # 0.69      Eos # 0.08      Baso # 0.04      IG# 0.04      IG% 0.7      NRBC% 0.0     HEMOGLOBIN A1C    Hemoglobin A1c 5.6      Estimated Average Glucose 114.0     URINALYSIS, REFLEX TO URINE CULTURE     EKG Readings: (Independently Interpreted)   Initial Reading: No STEMI. Rhythm: Atrial Fibrillation. Heart Rate: 80. Ectopy: No Ectopy. Conduction: Normal. ST Segments: Normal ST Segments. T Waves Flipped: V3, V4, V5, V6 and II. Clinical Impression: Normal  Sinus Rhythm     ECG Results              EKG 12-lead (In process)        Collection Time Result Time QRS Duration OHS QTC Calculation    09/05/24 14:46:37 09/05/24 15:26:50 68 440                     In process by Interface, Lab In [facility] (09/05/24 15:26:57)                   Narrative:    Test Reason : R53.1,    Vent. Rate : 080 BPM     Atrial Rate : 131 BPM     P-R Int : 000 ms          QRS Dur : 068 ms      QT Int : 382 ms       P-R-T Axes : 000 072 142 degrees     QTc Int : 440 ms    Atrial fibrillation  ST and T wave abnormality, consider anterolateral ischemia  Abnormal ECG  No previous ECGs available    Referred By: [email or web address]   SELF           Confirmed By:                                   Imaging Results              X-Ray Chest 1 View (In process)                      CT Head Without Contrast (Final result)  Result time 09/05/24 15:28:05      Final result by Dylan Robin MD (09/05/24 15:28:05)                   Impression:      No acute intracranial findings.      Electronically signed by: Dylan Robin  Date:    09/05/2024  Time:    15:28               Narrative:    EXAMINATION:  CT HEAD WITHOUT CONTRAST    CLINICAL HISTORY:  Dizziness, persistent/recurrent, cardiac or vascular cause suspected;    TECHNIQUE:  CT imaging of the head performed from the skull base to the vertex without intravenous contrast.   mGycm. Automatic exposure control, adjustment of mA/kV or iterative reconstruction technique was used to reduce radiation.    COMPARISON:  None Available.    FINDINGS:  There is no acute cortical infarct, hemorrhage or mass lesion.  There is prominent patchy hypoattenuation in the cerebral white matter which is nonspecific but most commonly associated with chronic small vessel ischemic changes.  There is moderate global atrophy.  The ventricles are not significantly enlarged.  There are dense vascular calcifications.    Visualized paranasal sinuses and mastoid air cells are clear. There is  debris in the external auditory canals.                                       Medications   enoxaparin injection 70 mg (has no administration in time range)   sodium chloride 0.9% flush 10 mL (has no administration in time range)   lactated ringers bolus 1,000 mL (1,000 mLs Intravenous New Bag 9/5/24 2628)     Medical Decision Making  Initial Assessment:   Syncope    Differential Diagnosis:   Judging by the patient's chief complaint and pertinent history, the patient has the following possible differential diagnoses, including but not limited to the following.  Some of these are deemed to be lower likelihood and some more likely based on my physical exam and history combined with possible lab work and/or imaging studies.   Please see the pertinent studies, and refer to the HPI.  Some of these diagnoses will take further evaluation to fully rule out, perhaps as an outpatient and the patient was encouraged to follow up when discharged for more comprehensive evaluation.  Dysrhythmia, AFib, electrolyte abnormality, dehydration, CVA, UTI, vasovagal,  as well as multiple other possible etiologies      Problems Addressed:  New onset a-fib: acute illness or injury  Syncope, unspecified syncope type: acute illness or injury  Weakness: acute illness or injury    Amount and/or Complexity of Data Reviewed  Labs: ordered. Decision-making details documented in ED Course.  Radiology: ordered. Decision-making details documented in ED Course.  ECG/medicine tests: ordered and independent interpretation performed.    Risk  Prescription drug management.  Decision regarding hospitalization.               ED Course as of 09/05/24 1617   Thu Sep 05, 2024   1523 WBC: 5.94 [BS]   1523 Hemoglobin: 13.3 [BS]   1523 Hematocrit: 39.3 [BS]   1523 Platelet Count: 225 [BS]   1531 Sodium(!): 132 [BS]   1531 Potassium(!): 3.4 [BS]   1531 Chloride: 101 [BS]   1531 CO2: 23 [BS]   1531 Glucose(!): 123 [BS]   1531 BUN: 19.7 [BS]   1531 Creatinine(!):  1.56 [BS]   1551 SARS-CoV2 (COVID-19) Qualitative PCR(!): Detected [BS]   1551 Influenza B, Molecular: Not Detected [BS]   1551 Influenza A, Molecular: Not Detected [BS]   1552 Will give patient 1 milligram/kilogram of Lovenox for AFib [BS]   1556 Hospitalist agrees with admission [BS]   1617 X-Ray Chest 1 View  No acute abnormalities [BS]      ED Course User Index  [BS] Terence Hickey MD                           Clinical Impression:  Final diagnoses:  [R53.1] Weakness  [R05.9] Cough  [I48.91] Atrial fibrillation  [I48.91] New onset a-fib (Primary)  [U07.1] COVID-19  [R55] Syncope, unspecified syncope type          ED Disposition Condition    Admit Stable                Terence Hickey MD  09/05/24 0898

## 2024-09-05 NOTE — ED TRIAGE NOTES
c/o weakness and collapsing when going to the bathroom, and then vomiting . Witnessed fall by her sitter

## 2024-09-06 LAB
APICAL FOUR CHAMBER EJECTION FRACTION: 59 %
APICAL TWO CHAMBER EJECTION FRACTION: 50 %
AV INDEX (PROSTH): 0.62
AV MEAN GRADIENT: 3 MMHG
AV PEAK GRADIENT: 5 MMHG
AV VALVE AREA BY VELOCITY RATIO: 2.09 CM²
AV VALVE AREA: 1.59 CM²
AV VELOCITY RATIO: 0.82
BSA FOR ECHO PROCEDURE: 1.83 M2
CV ECHO LV RWT: 0.61 CM
DOP CALC AO PEAK VEL: 1.11 M/S
DOP CALC AO VTI: 18.1 CM
DOP CALC LVOT AREA: 2.5 CM2
DOP CALC LVOT DIAMETER: 1.8 CM
DOP CALC LVOT PEAK VEL: 0.91 M/S
DOP CALC LVOT STROKE VOLUME: 28.74 CM3
DOP CALC MV VTI: 25.9 CM
DOP CALCLVOT PEAK VEL VTI: 11.3 CM
E WAVE DECELERATION TIME: 255 MSEC
E/A RATIO: 0.69
E/E' RATIO: 13.11 M/S
ECHO LV POSTERIOR WALL: 1.11 CM (ref 0.6–1.1)
FRACTIONAL SHORTENING: 20 % (ref 28–44)
INTERVENTRICULAR SEPTUM: 1.04 CM (ref 0.6–1.1)
LEFT ATRIUM AREA SYSTOLIC (APICAL 2 CHAMBER): 22 CM2
LEFT ATRIUM AREA SYSTOLIC (APICAL 4 CHAMBER): 16.1 CM2
LEFT ATRIUM SIZE: 4.3 CM
LEFT CCA DIST DIAS: 16 CM/S
LEFT CCA DIST SYS: 57 CM/S
LEFT CCA PROX DIAS: 4 CM/S
LEFT CCA PROX SYS: 127 CM/S
LEFT ECA DIAS: 7 CM/S
LEFT ECA SYS: 143 CM/S
LEFT ICA DIST DIAS: 19 CM/S
LEFT ICA DIST SYS: 75 CM/S
LEFT ICA MID DIAS: 15 CM/S
LEFT ICA MID SYS: 49 CM/S
LEFT ICA PROX DIAS: 0 CM/S
LEFT ICA PROX SYS: 45 CM/S
LEFT INTERNAL DIMENSION IN SYSTOLE: 2.93 CM (ref 2.1–4)
LEFT VENTRICLE DIASTOLIC VOLUME INDEX: 31.99 ML/M2
LEFT VENTRICLE DIASTOLIC VOLUME: 56.3 ML
LEFT VENTRICLE END DIASTOLIC VOLUME APICAL 2 CHAMBER: 43.2 ML
LEFT VENTRICLE END DIASTOLIC VOLUME APICAL 4 CHAMBER: 24 ML
LEFT VENTRICLE END SYSTOLIC VOLUME APICAL 2 CHAMBER: 63 ML
LEFT VENTRICLE END SYSTOLIC VOLUME APICAL 4 CHAMBER: 45.8 ML
LEFT VENTRICLE MASS INDEX: 70 G/M2
LEFT VENTRICLE SYSTOLIC VOLUME INDEX: 18.8 ML/M2
LEFT VENTRICLE SYSTOLIC VOLUME: 33 ML
LEFT VENTRICULAR INTERNAL DIMENSION IN DIASTOLE: 3.65 CM (ref 3.5–6)
LEFT VENTRICULAR MASS: 122.48 G
LEFT VERTEBRAL DIAS: 0 CM/S
LEFT VERTEBRAL SYS: 30 CM/S
LV LATERAL E/E' RATIO: 11.8 M/S
LV SEPTAL E/E' RATIO: 14.75 M/S
LVED V (TEICH): 56.3 ML
LVES V (TEICH): 33 ML
LVOT MG: 2 MMHG
LVOT MV: 0.62 CM/S
MV MEAN GRADIENT: 2 MMHG
MV PEAK A VEL: 0.85 M/S
MV PEAK E VEL: 0.59 M/S
MV PEAK GRADIENT: 5 MMHG
MV STENOSIS PRESSURE HALF TIME: 74 MS
MV VALVE AREA BY CONTINUITY EQUATION: 1.11 CM2
MV VALVE AREA P 1/2 METHOD: 2.97 CM2
OHS CV CAROTID RIGHT ICA EDV HIGHEST: 9
OHS CV CAROTID ULTRASOUND LEFT ICA/CCA RATIO: 1.32
OHS CV CAROTID ULTRASOUND RIGHT ICA/CCA RATIO: 0.57
OHS CV PV CAROTID LEFT HIGHEST CCA: 127
OHS CV PV CAROTID LEFT HIGHEST ICA: 75
OHS CV PV CAROTID RIGHT HIGHEST CCA: 97
OHS CV PV CAROTID RIGHT HIGHEST ICA: 52
OHS CV RV/LV RATIO: 0.73 CM
OHS CV US CAROTID LEFT HIGHEST EDV: 19
OHS LV EJECTION FRACTION SIMPSONS BIPLANE MOD: 57 %
PISA TR MAX VEL: 1.88 M/S
RA PRESSURE ESTIMATED: 3 MMHG
RIGHT CCA DIST DIAS: 12 CM/S
RIGHT CCA DIST SYS: 92 CM/S
RIGHT CCA PROX DIAS: 16 CM/S
RIGHT CCA PROX SYS: 97 CM/S
RIGHT ECA DIAS: 10 CM/S
RIGHT ECA SYS: 98 CM/S
RIGHT ICA DIST DIAS: 9 CM/S
RIGHT ICA DIST SYS: 52 CM/S
RIGHT ICA MID DIAS: 6 CM/S
RIGHT ICA MID SYS: 49 CM/S
RIGHT ICA PROX DIAS: 9 CM/S
RIGHT ICA PROX SYS: 42 CM/S
RIGHT VENTRICULAR END-DIASTOLIC DIMENSION: 2.68 CM
RIGHT VERTEBRAL DIAS: 6 CM/S
RIGHT VERTEBRAL SYS: 21 CM/S
RV TB RVSP: 5 MMHG
TDI LATERAL: 0.05 M/S
TDI SEPTAL: 0.04 M/S
TDI: 0.05 M/S
TR MAX PG: 14 MMHG
TRICUSPID ANNULAR PLANE SYSTOLIC EXCURSION: 2.52 CM
TV REST PULMONARY ARTERY PRESSURE: 17 MMHG
Z-SCORE OF LEFT VENTRICULAR DIMENSION IN END DIASTOLE: -2.88
Z-SCORE OF LEFT VENTRICULAR DIMENSION IN END SYSTOLE: -0.21

## 2024-09-06 PROCEDURE — 27000207 HC ISOLATION

## 2024-09-06 PROCEDURE — 63600175 PHARM REV CODE 636 W HCPCS: Performed by: STUDENT IN AN ORGANIZED HEALTH CARE EDUCATION/TRAINING PROGRAM

## 2024-09-06 PROCEDURE — 25000003 PHARM REV CODE 250: Performed by: INTERNAL MEDICINE

## 2024-09-06 PROCEDURE — 21400001 HC TELEMETRY ROOM

## 2024-09-06 PROCEDURE — 97161 PT EVAL LOW COMPLEX 20 MIN: CPT

## 2024-09-06 RX ORDER — POTASSIUM CHLORIDE 20 MEQ/1
40 TABLET, EXTENDED RELEASE ORAL
Status: COMPLETED | OUTPATIENT
Start: 2024-09-06 | End: 2024-09-06

## 2024-09-06 RX ORDER — ATORVASTATIN CALCIUM 10 MG/1
20 TABLET, FILM COATED ORAL NIGHTLY
Status: DISCONTINUED | OUTPATIENT
Start: 2024-09-07 | End: 2024-09-08 | Stop reason: HOSPADM

## 2024-09-06 RX ADMIN — METOPROLOL TARTRATE 12.5 MG: 25 TABLET, FILM COATED ORAL at 08:09

## 2024-09-06 RX ADMIN — ATORVASTATIN CALCIUM 20 MG: 10 TABLET, FILM COATED ORAL at 08:09

## 2024-09-06 RX ADMIN — MUPIROCIN: 20 OINTMENT TOPICAL at 09:09

## 2024-09-06 RX ADMIN — POTASSIUM CHLORIDE 40 MEQ: 1500 TABLET, EXTENDED RELEASE ORAL at 04:09

## 2024-09-06 RX ADMIN — MUPIROCIN: 20 OINTMENT TOPICAL at 08:09

## 2024-09-06 RX ADMIN — POTASSIUM CHLORIDE 40 MEQ: 1500 TABLET, EXTENDED RELEASE ORAL at 12:09

## 2024-09-06 RX ADMIN — ENOXAPARIN SODIUM 70 MG: 80 INJECTION SUBCUTANEOUS at 04:09

## 2024-09-06 RX ADMIN — METOPROLOL TARTRATE 12.5 MG: 25 TABLET, FILM COATED ORAL at 09:09

## 2024-09-06 NOTE — NURSING
Nurses Note -- 4 Eyes      9/6/2024   6:17 AM      Skin assessed during: Admit      [x] No Altered Skin Integrity Present    [x]Prevention Measures Documented      [] Yes- Altered Skin Integrity Present or Discovered   [] LDA Added if Not in Epic (Describe Wound)   [] New Altered Skin Integrity was Present on Admit and Documented in LDA   [] Wound Image Taken    Wound Care Consulted? No    Attending Nurse:  Amy CABALLERO RN     Second RN/Staff Member:  Eula CHUN RN

## 2024-09-06 NOTE — ED NOTES
[facility] called, transportation set up.   Pulse oximeter placed on patient's left index finger and left index finger.

## 2024-09-06 NOTE — H&P
[facility]  Hospital Medicine History & Physical Examination       Patient Name: Sandhya Meneses  MRN: [id number]  Patient Class: IP- Inpatient   Admission Date: 09/06/2024   Admitting Service: Hospital Medicine   Length of Stay: 1  Attending Physician: Niko Lara MD  Primary Care Provider: Brianne Primary Doctor  Face-to-Face encounter date: 09/06/2024  Code Status: Full code   Chief Complaint: Fatigue (c/o weakness and collapsing when going to the bathroom, and then vomiting . Witnessed fall by her sitter)      Patient information was obtained from patient, patient's family, past medical records and ER records.      HISTORY OF PRESENT ILLNESS:   Sandhya Meneses is a 83 y.o. female with a past medical history of hypertension, hyperlipidemia, and CKD stage IIIA who presented to [facility] ED on 9/5/2024 with c/o weakness.  Patient reported dizziness while standing followed by syncopal episode.  Patient stated after syncopal episode she vomited.  Caretaker denied patient hitting head.  Initial vital signs in ED were /58, pulse 81, respirations 18, temperature 37° C, and SpO2 100% on room air.  Labs revealed WBC 5.94, sodium 132, potassium 3.4, BUN 19.7, creatinine 1.56, glucose 123, and troponin 0.020.  Flu testing was negative.  COVID testing was positive.  UA revealed trace leukocytes.  EKG revealed atrial fibrillation with heart rate of 80 beats per minute.  Chest x-ray revealed no acute abnormality.  CT head without contrast revealed no acute intracranial findings.  Patient was given Lovenox 1mg/kg. Patient was admitted to hospital medicine service for further medical management.     PAST MEDICAL HISTORY:   Hypertension  Hyperlipidemia  CKD stage IIIA    PAST SURGICAL HISTORY:   History reviewed. No pertinent surgical history.    FAMILY HISTORY:   Reviewed and negative    SOCIAL HISTORY:   Denied alcohol, tobacco or illicit drug use.     Screening for Social Drivers for  health:  Patient screened for food insecurity, housing instability, transportation needs, utility difficulties, and interpersonal safety (select all that apply as identified as concern)  []Housing or Food  []Transportation Needs  []Utility Difficulties  []Interpersonal safety  [x]None    ALLERGIES:   Patient has no known allergies.    HOME MEDICATIONS:     Prior to Admission medications    Medication Sig Start Date End Date Taking? Authorizing Provider   lisinopriL-hydrochlorothiazide (PRINZIDE,ZESTORETIC) 20-12.5 mg per tablet Take 1 tablet by mouth.    Provider, Historical   lovastatin (MEVACOR) 10 MG tablet Take 10 mg by mouth.    Provider, Historical     ________________________________________________________________________  INPATIENT LIST OF MEDICATIONS     Current Facility-Administered Medications:     0.9%  NaCl infusion, , Intravenous, Continuous, India Strickland MD, Last Rate: 50 mL/hr at 09/05/24 2341, New Bag at 09/05/24 2341    acetaminophen tablet 650 mg, 650 mg, Oral, Q6H PRN, India Strickland MD    atorvastatin tablet 20 mg, 20 mg, Oral, Daily, Indai Strickland MD    enoxaparin injection 70 mg, 1 mg/kg (Dosing Weight), Subcutaneous, Q24H (treatment, non-standard time), Terence Hickey MD, 70 mg at 09/05/24 1651    metoprolol tartrate (LOPRESSOR) split tablet 12.5 mg, 12.5 mg, Oral, BID, India Strickland MD    mupirocin 2 % ointment, , Nasal, BID, Niko Lara MD    ondansetron injection 4 mg, 4 mg, Intravenous, Q6H PRN, India Strickland MD    sodium chloride 0.9% flush 10 mL, 10 mL, Intravenous, PRN, Terence Hickey MD    Scheduled Meds:   atorvastatin  20 mg Oral Daily    enoxparin  1 mg/kg (Dosing Weight) Subcutaneous Q24H (treatment, non-standard time)    metoprolol tartrate  12.5 mg Oral BID    mupirocin   Nasal BID     Continuous Infusions:   0.9% NaCl   Intravenous Continuous 50 mL/hr at 09/05/24 2341 New Bag at 09/05/24 2341     PRN Meds:.  Current Facility-Administered  Medications:     acetaminophen, 650 mg, Oral, Q6H PRN    ondansetron, 4 mg, Intravenous, Q6H PRN    sodium chloride 0.9%, 10 mL, Intravenous, PRN      REVIEW OF SYSTEMS:   Except as documented, all other systems reviewed and negative.    PHYSICAL EXAM:     VITAL SIGNS: 24 HRS MIN & MAX LAST   Temp  Min: 98.5 °F (36.9 °C)  Max: 98.6 °F (37 °C) 98.5 °F (36.9 °C)   BP  Min: 103/58  Max: 162/82 (!) 158/68   Pulse  Min: 64  Max: 85  85   Resp  Min: 12  Max: 22 (!) 22   SpO2  Min: 95 %  Max: 100 % 96 %         LABS AND IMAGING:     Recent Labs   Lab 09/05/24  1506   WBC 5.94   RBC 4.50   HGB 13.3   HCT 39.3   MCV 87.3   MCH 29.6   MCHC 33.8   RDW 13.7      MPV 9.6       Recent Labs   Lab 09/05/24  1506   *   K 3.4*      CO2 23   BUN 19.7   CREATININE 1.56*   CALCIUM 9.5   MG 2.00   ALBUMIN 3.4   ALKPHOS 48   ALT 7   AST 17   BILITOT 0.4       Microbiology Results (last 7 days)       ** No results found for the last 168 hours. **             X-Ray Chest 1 View  Narrative: EXAMINATION:  XR CHEST 1 VIEW    CLINICAL HISTORY:  Cough, unspecified    TECHNIQUE:  Single frontal view of the chest was performed.    COMPARISON:  01/25/2017    FINDINGS:  The lungs are clear.  The heart is normal appearance.  The pulmonary vascularity is unremarkable.  Aorta appears grossly unremarkable.  No pleural effusions are seen.  Bones and joints show no acute abnormality.  Impression: No abnormality seen    Electronically signed by: Yaya Garcia  Date:    09/05/2024  Time:    16:57  CT Head Without Contrast  Narrative: EXAMINATION:  CT HEAD WITHOUT CONTRAST    CLINICAL HISTORY:  Dizziness, persistent/recurrent, cardiac or vascular cause suspected;    TECHNIQUE:  CT imaging of the head performed from the skull base to the vertex without intravenous contrast.   mGycm. Automatic exposure control, adjustment of mA/kV or iterative reconstruction technique was used to reduce radiation.    COMPARISON:  None  Available.    FINDINGS:  There is no acute cortical infarct, hemorrhage or mass lesion.  There is prominent patchy hypoattenuation in the cerebral white matter which is nonspecific but most commonly associated with chronic small vessel ischemic changes.  There is moderate global atrophy.  The ventricles are not significantly enlarged.  There are dense vascular calcifications.    Visualized paranasal sinuses and mastoid air cells are clear. There is debris in the external auditory canals.  Impression: No acute intracranial findings.    Electronically signed by: Dylan Robin  Date:    09/05/2024  Time:    15:28        ASSESSMENT & PLAN:   Assessment:   New onset atrial fibrillation   Syncope   COVID positive   MARY KAY on CKD stage IIIA  History of hypertension, hyperlipidemia, and CKD stage IIIA      Plan:  Cardiac monitoring  Renal dose Lovenox   Echo  Cardiology consulted, appreciate recommendations   Lateral carotid artery ultrasound   Fall precautions   COVID 19 contact and droplet precautions   NS   Resume home medications as deemed appropriate once medication reconciliation is updated  Labs in AM    VTE Prophylaxis:  Lovenox    Discharge Planning and Disposition: TBD    I, Danie Garcia PA-C have reviewed and discussed the case with Niko Lara MD  Please see the attending MD's addendum for further assessment and plan.    Danie Garcia PA-C  Department of Hospital Medicine   [facility]   09/06/2024    This note was created with the assistance of [employer]ip[employer] voice recognition software. There may be transcription errors as a result of using this technology, however minimal. Effort has been made to assure accuracy of transcription, but any obvious errors or omissions should be clarified with the author of the document.    _______________________________________________________________________________  MD Addendum:  I, Dr. Jane blancas  , assumed care of this patient today  For the  patient encounter, I performed the substantive portion of the visit, I reviewed the NP/PA documentation, treatment plan, and medical decision making.  I had face to face time with this patient     A. History:  Chief Complaint: Fatigue (c/o weakness and collapsing when going to the bathroom, and then vomiting . Witnessed fall by her sitter)        Patient information was obtained from patient, patient's family, past medical records and ER records.        HISTORY OF PRESENT ILLNESS:   Sandhya Meneses is a 83 y.o. female with a past medical history of hypertension, hyperlipidemia, and CKD stage IIIA who presented to [facility] ED on 9/5/2024 with c/o weakness.  Patient reported dizziness while standing followed by syncopal episode.  Patient stated after syncopal episode she vomited.  Caretaker denied patient hitting head.  Initial vital signs in ED were /58, pulse 81, respirations 18, temperature 37° C, and SpO2 100% on room air.  Labs revealed WBC 5.94, sodium 132, potassium 3.4, BUN 19.7, creatinine 1.56, glucose 123, and troponin 0.020.  Flu testing was negative.  COVID testing was positive.  UA revealed trace leukocytes.  EKG revealed atrial fibrillation with heart rate of 80 beats per minute.  Chest x-ray revealed no acute abnormality.  CT head without contrast revealed no acute intracranial findings.  Patient was given Lovenox 1mg/kg. Patient was admitted to hospital medicine service for further medical management.     Exam  GENERAL: awake and in no acute distress  LUNGS: CTA anteriorly  CVS: Normal rate  ABD: Soft, non-tender  EXTREMITIES: no LE edema  NEURO: AAOx3  PSYCHIATRIC: Cooperative     ASSESSMENT & PLAN:   Assessment:   New onset atrial fibrillation , cvr   Syncope likely d/y covid 19 infection and ivvd  COVID positive   MARY KAY on CKD stage IIIA  IVVD   Hypokalemia   History of hypertension, hyperlipidemia, and CKD stage IIIA          Plan:  Admit   Cardiac monitoring  Renal dose Lovenox   F/up  Echo  Cardiology consulted, appreciate recommendations   CUS   COVID 19 contact and droplet precautions   NS IV at 50 cc/hr  Replete k 40 x 2  Check mg levels   Resume home medications as deemed appropriate once medication reconciliation is updated  Fall precautions   Labs in AM     VTE Prophylaxis:  Lovenox     Discharge Planning and Disposition: TBD       All diagnosis and differential diagnosis have been reviewed; assessment and plan has been documented; I have personally reviewed the labs and test results that are presently available; I have reviewed the patients medication list; I have reviewed the consulting providers response and recommendations. I have reviewed or attempted to review medical records based upon their availability.    All of the patient and family questions have been addressed and answered. Patient's is agreeable to the above stated plan. I will continue to monitor closely and make adjustments to medical management as needed.      09/06/2024

## 2024-09-06 NOTE — H&P
Chief Complaint; dizziness and passing out earlier today    HPI:   Patient is a 83 y.o. female with a medical history of hypertension, CKD stage 3, and hyperlipidemia presents to the ER on account of dizziness and passing out episode earlier today.    The patient has been at her usual state of health until earlier today while she was walking suddenly said dizzy and passed out for few seconds.  She denies any chest pain or shortness of breadth.  No nausea or vomiting.  No fever.  He was brought to the ER for further evaluation.    At the ER, she was found positive COVID-19 infection.  Chest x-ray shows no acute findings.  EKG was also appears to be abnormal, he does shows probable atrial bigeminy versus AFib.  CT of the brain shows no acute findings.  Patient is planned for transfer to the main [facility] for further evaluation by the cardiologist.  Patient's blood pressure has been relatively low    PCP No, Primary Doctor MD        History reviewed. No pertinent past medical history.  Hypertension and hyperlipidemia    History reviewed. No pertinent surgical history.  Unknown    (Not in a hospital admission)  Please see medication reconciliation chart    Review of patient's allergies indicates:  No Known Allergies     Social History     Tobacco Use    Smoking status: Unknown    Smokeless tobacco: Not on file   Substance Use Topics    Alcohol use: Not on file        No family history on file.  Significant for family history of hypertension    Review of Systems  Review of Systems   Constitutional: Negative for fever.  Dizziness and syncope episode  HEENT: Negative for drooling, ear pain, facial swelling and nosebleeds.    Eyes: Negative for discharge and visual disturbance.   Respiratory: Negative for cough, negative shortness of breath.    Cardiovascular: negative for chest pain or SOB  Gastrointestinal: Negative for anal bleeding and rectal pain. Negative Nausea or Vomiting.  Genitourinary: Negative for decreased urine  "volume and dysuria  Musculoskeletal: Negative for neck pain.   Skin: Negative for rash.   Neurological: negative for numbness, negative for weakness,negative for seizures and facial asymmetry.              Objective:     No intake/output data recorded.    /76   Pulse 80   Temp 98.6 °F (37 °C) (Oral)   Resp 20   Ht 5' (1.524 m)   Wt 73.9 kg (163 lb)   LMP  (LMP Unknown) Comment: She's 83 years old  SpO2 100%   Breastfeeding No   BMI 31.83 kg/m²     General Appearance:    Awake, alert, not in acute distress   HEENT:   atraumatic, PERRL, EOM intact, conjuctiva pink, sclera anicteric, oropharynx moist, no lesions noted   Neck:    Supple, no JVD, no carotid bruits, no lymphadenopathy or thyromegaly noted       Pulmonary:   Clear to auscultation bilaterally, reduced breath sounds bileterally.   Cardiovascular:    Regular rate and rhythm, S1 S2 normal   Abdomen:     Soft, non-tender,nondistended, bowel sounds active all four quadrants, no masses, no organomegaly   Extremities:  no edema, no cyanosis or clubbing noted       Skin:   No bruises noted.       Neurologic: Alert, awake, oriented x 3, moves all extremities.                 Data Review :   Labs:    CBC:   Lab Results   Component Value Date    WBC 5.94 09/05/2024    RBC 4.50 09/05/2024    HGB 13.3 09/05/2024    HCT 39.3 09/05/2024     09/05/2024     CMP:   Lab Results   Component Value Date     (L) 09/05/2024    K 3.4 (L) 09/05/2024     09/05/2024    CO2 23 09/05/2024    BUN 19.7 09/05/2024    CREATININE 1.56 (H) 09/05/2024    CALCIUM 9.5 09/05/2024    ALKPHOS 48 09/05/2024    AST 17 09/05/2024    ALT 7 09/05/2024    ALBUMIN 3.4 09/05/2024    BILITOT 0.4 09/05/2024     Cardiac markers: No results found for: "BNP", "CKMB", "CKTOTAL", "TROPONIN", "MYOGLOBIN"    Radiology:  Micro:  No components found for: "BLOODCX", "SPUTUMCX", "URINECX"    Radiology:  @[place] [id number]@        Assessment & Plan:   1. Syncope episodes; probably related to " orthostatic hypotension, blood pressure was relatively low upon arrival to our facility, continue orthostatic vital sign monitoring.  CT of the brain shows no acute findings.  Follow up 2D echocardiogram.  Continue IV fluids.  Concerned about possible arrhythmias.  Patient has been transferred to the [facility] for further evaluation by the cardiologist    2. Abnormal EKG; concerning for atrial bigeminy to rule out AFib, , follow up TSH level and 2D echocardiogram.  Patient was commenced on full anticoagulation from the ER.  We will defer further management to the cardiologist    3. COVID-19 infection; asymptomatic, chest x-ray shows no acute findings.  Continue supportive care.    4. History of hypertension; blood pressure relatively low, hold off on lisinopril and hydrochlorothiazide.  On low-dose metoprolol.    5. Hyperlipidemia; continue statin    6. Chronic kidney disease stage IIIA, monitor kidney function and avoid nephrotoxic agents.    7. Maintain the patient on DVT prophylaxis; already on weight based Lovenox    Disposition; patient has been transferred to the [facility] for further evaluation by the cardiologist  This note was completed using voice recognition software and transcription errors may occur.    This Encounter was via Telemedicine (Both audio and visual ) and from [place], [place].  Patient was located in [place].    Evaluation  of the patient was done alongside the patient's nursing staff       India Strickland  9/5/2024  11:35 PM

## 2024-09-06 NOTE — CONSULTS
Inpatient consult to Cardiology  Consult performed by: Niko Lara MD  Consult ordered by: Terence Hickey MD  Reason for consult: New Onset Afib      [facility] - [facility] Medical Telemetry    Electrophysiology  Consult Note    Patient Name: Sandhya Meneses  MRN: [id number]  Admission Date: 9/5/2024  Hospital Length of Stay: 1 days  Code Status: Full Code   Attending Provider: Niko Lara MD   Consulting Provider: Beverly Barrera RN  Primary Care Physician: No, Primary Doctor  Principal Problem:<principal problem not specified>    Patient information was obtained from patient, past medical records, ER records, and primary team.     Subjective:     Chief Complaint/Reason for Consult: Atrial Fibrbrillation    HPI: Ms. Meneses is an 84 y/o female unknown to [facility]. On 9.5.24 the patient was brought to the ER for states that she was experiencing some weakness. She voices feeling dizzy and and passed out when she stood up to go to the bathroom.After that episode she vomited. Her caregiver states that she did not hit her head. She denies any cardiac history, CP or SOB.At the ER, she was found positive COVID-19 infection. Chest x-ray shows no acute findings. EKG was also appears to be abnormal, he does shows probable atrial bigeminy versus AFib. CT of the brain shows no acute findings. Abnormal Labs INR 1.0, Na+ 132, K+ 3.2, Cr 1.56,Patient was transferred to [facility]. [facility] was consulted for evaluation for AFIB.    PMH: HTN, CKD stage 3a (Dr. Moreira), HLD  PSH: Left Nephrectomy, Mammogram  Family History: Mother Heart disease, Father Cirrhosis  Social History: Denies smoking, Occasional ETOH use, and illicit drug use    Previous Cardiac Diagnostics:   Echo: 9.6.24  Left Ventricle: The left ventricle is normal in size. Mildly increased wall thickness. There is normal systolic function with a visually estimated ejection fraction of 55 - 60%. Grade I diastolic dysfunction.  Right Ventricle:  Normal right ventricular cavity size. Systolic function is normal.  Left Atrium: Left atrium is mildly dilated.  Tricuspid Valve: There is physiologically normal regurgitation.    CV US Bilateral Carotid Doppler 9.6.24  The right internal carotid artery was patent with less than 50% stenosis.   The left internal carotid artery was patent with less than 50% stenosis.   Bilateral vertebral arteries were patent with antegrade flow.     Review of patient's allergies indicates:  No Known Allergies  No current facility-administered medications on file prior to encounter.     Current Outpatient Medications on File Prior to Encounter   Medication Sig    lisinopriL-hydrochlorothiazide (PRINZIDE,ZESTORETIC) 20-12.5 mg per tablet Take 1 tablet by mouth.    lovastatin (MEVACOR) 10 MG tablet Take 10 mg by mouth.         Review of Systems   Reason unable to perform ROS: COVID Postflor.     Objective:     Vital Signs (Most Recent):  Temp: 98.5 °F (36.9 °C) (09/06/24 0605)  Pulse: 84 (09/06/24 0606)  Resp: 12 (09/06/24 0445)  BP: (!) 148/78 (09/06/24 0606)  SpO2: 99 % (09/06/24 0606) Vital Signs (24h Range):  Temp:  [98.5 °F (36.9 °C)-98.6 °F (37 °C)] 98.5 °F (36.9 °C)  Pulse:  [64-84] 84  Resp:  [12-21] 12  SpO2:  [95 %-100 %] 99 %  BP: (103-162)/(54-82) 148/78   Weight: 79.3 kg (174 lb 13.2 oz)  Body mass index is 34.14 kg/m².  SpO2: 99 %       Intake/Output Summary (Last 24 hours) at 9/6/2024 0724  Last data filed at 9/5/2024 1436  Gross per 24 hour   Intake 250 ml   Output --   Net 250 ml     Lines/Drains/Airways       Peripheral Intravenous Line  Duration                  Peripheral IV - Single Lumen 09/05/24 1430 18 G 1 1/4 in No Left Antecubital <1 day                  Significant Labs:  Recent Results (from the past 72 hour(s))   EKG 12-lead    Collection Time: 09/05/24  2:46 PM   Result Value Ref Range    QRS Duration 68 ms    [facility] QTC Calculation 440 ms   APTT    Collection Time: 09/05/24  3:06 PM   Result Value Ref Range     PTT 28.5 23.4 - 33.9 seconds   Comprehensive metabolic panel    Collection Time: 09/05/24  3:06 PM   Result Value Ref Range    Sodium 132 (L) 136 - 145 mmol/L    Potassium 3.4 (L) 3.5 - 5.1 mmol/L    Chloride 101 98 - 107 mmol/L    CO2 23 23 - 31 mmol/L    Glucose 123 (H) 82 - 115 mg/dL    Blood Urea Nitrogen 19.7 9.8 - 20.1 mg/dL    Creatinine 1.56 (H) 0.55 - 1.02 mg/dL    Calcium 9.5 8.4 - 10.2 mg/dL    Protein Total 7.0 5.8 - 7.6 gm/dL    Albumin 3.4 3.4 - 4.8 g/dL    Globulin 3.6 (H) 2.4 - 3.5 gm/dL    Albumin/Globulin Ratio 0.9 (L) 1.1 - 2.0 ratio    Bilirubin Total 0.4 <=1.5 mg/dL    ALP 48 40 - 150 unit/L    ALT 7 0 - 55 unit/L    AST 17 5 - 34 unit/L    eGFR 33 mL/min/1.73/m2    Anion Gap 8.0 mEq/L    BUN/Creatinine Ratio 13    COVID/FLU A&B PCR    Collection Time: 09/05/24  3:06 PM   Result Value Ref Range    Influenza A PCR Not Detected Not Detected    Influenza B PCR Not Detected Not Detected    SARS-CoV-2 PCR Detected (A) Not Detected, Negative   Magnesium    Collection Time: 09/05/24  3:06 PM   Result Value Ref Range    Magnesium Level 2.00 1.60 - 2.60 mg/dL   Troponin I    Collection Time: 09/05/24  3:06 PM   Result Value Ref Range    Troponin-I 0.020 0.000 - 0.045 ng/mL   TSH    Collection Time: 09/05/24  3:06 PM   Result Value Ref Range    TSH 3.219 0.350 - 4.940 uIU/mL   CBC with Differential    Collection Time: 09/05/24  3:06 PM   Result Value Ref Range    WBC 5.94 4.50 - 11.50 x10(3)/mcL    RBC 4.50 4.20 - 5.40 x10(6)/mcL    Hgb 13.3 12.0 - 16.0 g/dL    Hct 39.3 37.0 - 47.0 %    MCV 87.3 80.0 - 94.0 fL    MCH 29.6 27.0 - 31.0 pg    MCHC 33.8 33.0 - 36.0 g/dL    RDW 13.7 11.5 - 17.0 %    Platelet 225 130 - 400 x10(3)/mcL    MPV 9.6 7.4 - 10.4 fL    Neut % 55.6 %    Lymph % 30.1 %    Mono % 11.6 %    Eos % 1.3 %    Basophil % 0.7 %    Lymph # 1.79 0.6 - 4.6 x10(3)/mcL    Neut # 3.30 2.1 - 9.2 x10(3)/mcL    Mono # 0.69 0.1 - 1.3 x10(3)/mcL    Eos # 0.08 0 - 0.9 x10(3)/mcL    Baso # 0.04 <=0.2  x10(3)/mcL    IG# 0.04 0 - 0.04 x10(3)/mcL    IG% 0.7 %    NRBC% 0.0 %   Protime-INR    Collection Time: 09/05/24  3:06 PM   Result Value Ref Range    PT 13.7 11.7 - 14.5 seconds    INR 1.0 (L) 2.0 - 3.0   Hemoglobin A1C    Collection Time: 09/05/24  4:01 PM   Result Value Ref Range    Hemoglobin A1c 5.6 <=7.0 %    Estimated Average Glucose 114.0 mg/dL   Urinalysis, Reflex to Urine Culture    Collection Time: 09/05/24  7:37 PM    Specimen: Urine   Result Value Ref Range    Color, UA Straw Yellow, Light-Yellow, Dark Yellow, Sobeida, Straw    Appearance, UA Clear Clear    Specific Gravity, UA <=1.005 1.005 - 1.030    pH, UA 6.5 5.0 - 8.5    Protein, UA Negative Negative    Glucose, UA Negative Negative, Normal    Ketones, UA Negative Negative    Blood, UA Negative Negative    Bilirubin, UA Negative Negative    Urobilinogen, UA 0.2 0.2, 1.0, Normal    Nitrites, UA Negative Negative    Leukocyte Esterase, UA Trace (A) Negative   Urinalysis, Microscopic    Collection Time: 09/05/24  7:37 PM   Result Value Ref Range    Bacteria, UA None Seen None Seen, Rare, Occasional /HPF    RBC, UA 0-2 None Seen, 0-2, 3-5, 0-5 /HPF    WBC, UA 0-5 None Seen, 0-2, 3-5, 0-5 /HPF    Squamous Epithelial Cells, UA Few (A) None Seen, Rare, Occasional, Occ /HPF     Significant Imaging:  Imaging Results              X-Ray Chest 1 View (Final result)  Result time 09/05/24 16:57:56      Final result by Benji Garcia MD (09/05/24 16:57:56)                   Impression:      No abnormality seen      Electronically signed by: Yaya Garcia  Date:    09/05/2024  Time:    16:57               Narrative:    EXAMINATION:  XR CHEST 1 VIEW    CLINICAL HISTORY:  Cough, unspecified    TECHNIQUE:  Single frontal view of the chest was performed.    COMPARISON:  01/25/2017    FINDINGS:  The lungs are clear.  The heart is normal appearance.  The pulmonary vascularity is unremarkable.  Aorta appears grossly unremarkable.  No pleural effusions are seen.   Bones and joints show no acute abnormality.                                       CT Head Without Contrast (Final result)  Result time 09/05/24 15:28:05      Final result by Dylan Robin MD (09/05/24 15:28:05)                   Impression:      No acute intracranial findings.      Electronically signed by: Dylan Robin  Date:    09/05/2024  Time:    15:28               Narrative:    EXAMINATION:  CT HEAD WITHOUT CONTRAST    CLINICAL HISTORY:  Dizziness, persistent/recurrent, cardiac or vascular cause suspected;    TECHNIQUE:  CT imaging of the head performed from the skull base to the vertex without intravenous contrast.   mGycm. Automatic exposure control, adjustment of mA/kV or iterative reconstruction technique was used to reduce radiation.    COMPARISON:  None Available.    FINDINGS:  There is no acute cortical infarct, hemorrhage or mass lesion.  There is prominent patchy hypoattenuation in the cerebral white matter which is nonspecific but most commonly associated with chronic small vessel ischemic changes.  There is moderate global atrophy.  The ventricles are not significantly enlarged.  There are dense vascular calcifications.    Visualized paranasal sinuses and mastoid air cells are clear. There is debris in the external auditory canals.                                    EKG:       Telemetry:        Physical Exam  Vitals and nursing note reviewed.   Cardiovascular:      Rate and Rhythm: Rhythm irregular.      Comments: Telemetry: SR with Frequent PACs      Home Medications:   No current facility-administered medications on file prior to encounter.     Current Outpatient Medications on File Prior to Encounter   Medication Sig Dispense Refill    lisinopriL-hydrochlorothiazide (PRINZIDE,ZESTORETIC) 20-12.5 mg per tablet Take 1 tablet by mouth.      lovastatin (MEVACOR) 10 MG tablet Take 10 mg by mouth.       Current Inpatient Medications:    Current Facility-Administered Medications:     0.9%   NaCl infusion, , Intravenous, Continuous, India Strickland MD, Last Rate: 50 mL/hr at 09/05/24 2341, New Bag at 09/05/24 2341    acetaminophen tablet 650 mg, 650 mg, Oral, Q6H PRN, India Strickland MD    atorvastatin tablet 20 mg, 20 mg, Oral, Daily, India Strickland MD    enoxaparin injection 70 mg, 1 mg/kg (Dosing Weight), Subcutaneous, Q24H (treatment, non-standard time), Terence Hickey MD, 70 mg at 09/05/24 1651    metoprolol tartrate (LOPRESSOR) split tablet 12.5 mg, 12.5 mg, Oral, BID, India Strickland MD    mupirocin 2 % ointment, , Nasal, BID, Niko Lara MD    ondansetron injection 4 mg, 4 mg, Intravenous, Q6H PRN, India Strickland MD    sodium chloride 0.9% flush 10 mL, 10 mL, Intravenous, PRN, Terence Hickey MD  VTE Risk Mitigation (From admission, onward)           Ordered     enoxaparin injection 70 mg  Every 24 hours         09/05/24 1552                  Assessment:   New Onset Afib-Abnormal EKG; concerning for atrial bigeminy to rule out AFib    -Metoprolol 12.5 mg BID  Positive COVID-19-Asymptomatic  Electrolyte Derangements (K+3.4, Na+132)  Elevated Creatinine 1.56    -EGFR:48, 45, 46   Syncope  Hypertension-Chronic  CKD stage 3a  HLD (atorvastatin 20 mg)  Plan:   The patient's chart, EKG and Telemetry monitor was reviewed; EKG revealed SR with Atrial Bigeminy.  Continue low dose Metoprolol 12.5 mg for HR control  Resume Lisinopril/HCTZ 20-12.5mg 1 tab daily for Blood pressure control  Recommend the Primary Team to get US of Thyroid  Electrolyte management per Primary team  Keep Potassium >4.0 and Mg >2.0  [facility] will be signing off, but will be available if needed.    Thank you for your consult.     I, Beverly Barrera RN, transcribed this report in the presence of Raudel Hidaglo MD.     Beverly Barrera RN  Electrophysiology  [facility] - [facility] Medical Telemetry  09/06/2024

## 2024-09-06 NOTE — PT/OT/SLP EVAL
Physical Therapy Evaluation and Discharge Note    Patient Name:  Sandhya Meneses   MRN:  [id number]    Recommendations:     Discharge therapy intensity: No Therapy Indicated   Discharge Equipment Recommendations:     Barriers to discharge: None    Assessment:     Sandhya Meneses is a 83 y.o. female admitted with a medical diagnosis of syncope. .  At this time, patient is functioning at their prior level of function and does not require further acute PT services.     Recent Surgery: * No surgery found *      Plan:     During this hospitalization, patient does not require further acute PT services.  Please re-consult if situation changes.      Subjective     Chief Complaint:   Patient/Family Comments/goals:   Pain/Comfort:       Patients cultural, spiritual, Restoration conflicts given the current situation:      Living Environment:  Ptlives alone  Prior to admission, patients level of function was ind.  Equipment used at home:  .  DME owned (not currently used): .  Upon discharge, patient will have assistance from herself.    Objective:     Communicated with nurse prior to session.  Patient found supine with   upon PT entry to room.    General Precautions: Standard,      Orthopedic Precautions:    Braces:    Respiratory Status: Room air  Blood Pressure:     Exams:  RLE ROM: WFL  RLE Strength: WFL  LLE ROM: WFL  LLE Strength: WFL    Functional Mobility:  Bed Mobility:     Supine to Sit: independence  Sit to Supine: independence  Transfers:     Sit to Stand:  independence with no AD  Bed to Chair: independence with  no AD  using  Step Transfer  Gait: ambulated ind 60ft in room  with no ad    AM-PAC 6 CLICK MOBILITY  Total Score:        Treatment and Education:      Patient provided with verbal education education regarding PT role/goals/POC.  Understanding was verbalized.     Patient left supine with all lines intact and call button in reach.    GOALS:   Multidisciplinary Problems       Physical Therapy Goals        Not on file                    History:     History reviewed. No pertinent past medical history.    History reviewed. No pertinent surgical history.    Time Tracking:     PT Received On:    PT Start Time: 1350     PT Stop Time: 1405  PT Total Time (min): 15 min     Billable Minutes: Evaluation 15      09/06/2024

## 2024-09-06 NOTE — PLAN OF CARE
09/06/24 0915   Discharge Assessment   Assessment Type Discharge Planning Assessment   Confirmed/corrected address, phone number and insurance Yes   Confirmed Demographics Correct on Facesheet   Source of Information patient   When was your last doctors appointment? 05/13/24   Does patient/caregiver understand observation status Yes   Communicated MAX with patient/caregiver Yes   Reason For Admission afib, covid 19   People in Home alone   Facility Arrived From: home   Do you expect to return to your current living situation? Yes   Do you have help at home or someone to help you manage your care at home? Yes   Who are your caregiver(s) and their phone number(s)? [employer] and sitter comes on Thursdays   Prior to hospitilization cognitive status: Alert/Oriented   Current cognitive status: Alert/Oriented   Walking or Climbing Stairs Difficulty no   Dressing/Bathing Difficulty no   Equipment Currently Used at Home none   Patient currently being followed by outpatient case management? No   Do you currently have service(s) that help you manage your care at home? No   Do you take prescription medications? Yes   Do you have any problems affording any of your prescribed medications? No   Is the patient taking medications as prescribed? yes   Who is going to help you get home at discharge? [facility]   How do you get to doctors appointments? family or friend will provide   Are you on dialysis? No   Do you take coumadin? No   Discharge Plan A Home   Discharge Plan B Home Health   DME Needed Upon Discharge  other (see comments)  (tbd)   Discharge Plan discussed with: Patient   Transition of Care Barriers None   Housing Stability   In the last 12 months, was there a time when you were not able to pay the mortgage or rent on time? N   At any time in the past 12 months, were you homeless or living in a shelter (including now)? N   Transportation Needs   Has the lack of transportation kept you from medical appointments, meetings, work or  from getting things needed for daily living? No   Food Insecurity   Within the past 12 months, you worried that your food would run out before you got the money to buy more. Never true   Within the past 12 months, the food you bought just didn't last and you didn't have money to get more. Never true   Alcohol Use   Q2: How many drinks containing alcohol do you have on a typical day when you are drinking? None   Utilities   In the past 12 months has the electric, gas, oil, or water company threatened to shut off services in your home? No   Health Literacy   How often do you need to have someone help you when you read instructions, pamphlets, or other written material from your doctor or pharmacy? Sometimes     Pt reports that she lives alone. She does not drive. She has a sitter that comes every Thurs. She did not know the name of the company that the sitter is with . She reports no DME or HH services.Needs TBD.

## 2024-09-07 LAB
ALBUMIN SERPL-MCNC: 3.1 G/DL (ref 3.4–4.8)
ALBUMIN/GLOB SERPL: 1.1 RATIO (ref 1.1–2)
ALP SERPL-CCNC: 46 UNIT/L (ref 40–150)
ALT SERPL-CCNC: 5 UNIT/L (ref 0–55)
ANION GAP SERPL CALC-SCNC: 5 MEQ/L
AST SERPL-CCNC: 16 UNIT/L (ref 5–34)
BASOPHILS # BLD AUTO: 0.02 X10(3)/MCL
BASOPHILS NFR BLD AUTO: 0.3 %
BILIRUB SERPL-MCNC: 0.6 MG/DL
BUN SERPL-MCNC: 14.1 MG/DL (ref 9.8–20.1)
CALCIUM SERPL-MCNC: 9.1 MG/DL (ref 8.4–10.2)
CHLORIDE SERPL-SCNC: 109 MMOL/L (ref 98–107)
CO2 SERPL-SCNC: 20 MMOL/L (ref 23–31)
CREAT SERPL-MCNC: 0.99 MG/DL (ref 0.55–1.02)
CREAT/UREA NIT SERPL: 14
EOSINOPHIL # BLD AUTO: 0.02 X10(3)/MCL (ref 0–0.9)
EOSINOPHIL NFR BLD AUTO: 0.3 %
ERYTHROCYTE [DISTWIDTH] IN BLOOD BY AUTOMATED COUNT: 14 % (ref 11.5–17)
GFR SERPLBLD CREATININE-BSD FMLA CKD-EPI: 57 ML/MIN/1.73/M2
GLOBULIN SER-MCNC: 2.9 GM/DL (ref 2.4–3.5)
GLUCOSE SERPL-MCNC: 81 MG/DL (ref 82–115)
HCT VFR BLD AUTO: 37.7 % (ref 37–47)
HGB BLD-MCNC: 12.4 G/DL (ref 12–16)
IMM GRANULOCYTES # BLD AUTO: 0.02 X10(3)/MCL (ref 0–0.04)
IMM GRANULOCYTES NFR BLD AUTO: 0.3 %
LYMPHOCYTES # BLD AUTO: 1.88 X10(3)/MCL (ref 0.6–4.6)
LYMPHOCYTES NFR BLD AUTO: 32.5 %
MAGNESIUM SERPL-MCNC: 1.8 MG/DL (ref 1.6–2.6)
MCH RBC QN AUTO: 29.2 PG (ref 27–31)
MCHC RBC AUTO-ENTMCNC: 32.9 G/DL (ref 33–36)
MCV RBC AUTO: 88.7 FL (ref 80–94)
MONOCYTES # BLD AUTO: 0.67 X10(3)/MCL (ref 0.1–1.3)
MONOCYTES NFR BLD AUTO: 11.6 %
NEUTROPHILS # BLD AUTO: 3.18 X10(3)/MCL (ref 2.1–9.2)
NEUTROPHILS NFR BLD AUTO: 55 %
NRBC BLD AUTO-RTO: 0 %
PLATELET # BLD AUTO: 170 X10(3)/MCL (ref 130–400)
PMV BLD AUTO: 9.8 FL (ref 7.4–10.4)
POTASSIUM SERPL-SCNC: 4.7 MMOL/L (ref 3.5–5.1)
PROT SERPL-MCNC: 6 GM/DL (ref 5.8–7.6)
RBC # BLD AUTO: 4.25 X10(6)/MCL (ref 4.2–5.4)
SODIUM SERPL-SCNC: 134 MMOL/L (ref 136–145)
TSH SERPL-ACNC: 0.87 UIU/ML (ref 0.35–4.94)
WBC # BLD AUTO: 5.79 X10(3)/MCL (ref 4.5–11.5)

## 2024-09-07 PROCEDURE — 80053 COMPREHEN METABOLIC PANEL: CPT | Performed by: PHYSICIAN ASSISTANT

## 2024-09-07 PROCEDURE — 25000003 PHARM REV CODE 250: Performed by: INTERNAL MEDICINE

## 2024-09-07 PROCEDURE — 21400001 HC TELEMETRY ROOM

## 2024-09-07 PROCEDURE — 63600175 PHARM REV CODE 636 W HCPCS: Performed by: STUDENT IN AN ORGANIZED HEALTH CARE EDUCATION/TRAINING PROGRAM

## 2024-09-07 PROCEDURE — 36415 COLL VENOUS BLD VENIPUNCTURE: CPT | Performed by: PHYSICIAN ASSISTANT

## 2024-09-07 PROCEDURE — 83735 ASSAY OF MAGNESIUM: CPT | Performed by: INTERNAL MEDICINE

## 2024-09-07 PROCEDURE — 84443 ASSAY THYROID STIM HORMONE: CPT | Performed by: INTERNAL MEDICINE

## 2024-09-07 PROCEDURE — 85025 COMPLETE CBC W/AUTO DIFF WBC: CPT | Performed by: PHYSICIAN ASSISTANT

## 2024-09-07 PROCEDURE — 27000207 HC ISOLATION

## 2024-09-07 PROCEDURE — 63600175 PHARM REV CODE 636 W HCPCS: Performed by: INTERNAL MEDICINE

## 2024-09-07 RX ORDER — MAGNESIUM SULFATE HEPTAHYDRATE 40 MG/ML
2 INJECTION, SOLUTION INTRAVENOUS ONCE
Status: COMPLETED | OUTPATIENT
Start: 2024-09-07 | End: 2024-09-07

## 2024-09-07 RX ADMIN — ATORVASTATIN CALCIUM 20 MG: 10 TABLET, FILM COATED ORAL at 09:09

## 2024-09-07 RX ADMIN — MUPIROCIN: 20 OINTMENT TOPICAL at 09:09

## 2024-09-07 RX ADMIN — ENOXAPARIN SODIUM 70 MG: 80 INJECTION SUBCUTANEOUS at 04:09

## 2024-09-07 RX ADMIN — MAGNESIUM SULFATE HEPTAHYDRATE 2 G: 40 INJECTION, SOLUTION INTRAVENOUS at 03:09

## 2024-09-07 RX ADMIN — SODIUM CHLORIDE: 9 INJECTION, SOLUTION INTRAVENOUS at 05:09

## 2024-09-07 RX ADMIN — METOPROLOL TARTRATE 12.5 MG: 25 TABLET, FILM COATED ORAL at 09:09

## 2024-09-07 NOTE — PROGRESS NOTES
[facility] Hospital Medicine Progress Note        A. History:  Chief Complaint: Fatigue (c/o weakness and collapsing when going to the bathroom, and then vomiting . Witnessed fall by her sitter)        Patient information was obtained from patient, patient's family, past medical records and ER records.        HISTORY OF PRESENT ILLNESS:   Sandhya Meneses is a 83 y.o. female with a past medical history of hypertension, hyperlipidemia, and CKD stage IIIA who presented to [facility] ED on 9/5/2024 with c/o weakness.  Patient reported dizziness while standing followed by syncopal episode.  Patient stated after syncopal episode she vomited.  Caretaker denied patient hitting head.  Initial vital signs in ED were /58, pulse 81, respirations 18, temperature 37° C, and SpO2 100% on room air.  Labs revealed WBC 5.94, sodium 132, potassium 3.4, BUN 19.7, creatinine 1.56, glucose 123, and troponin 0.020.  Flu testing was negative.  COVID testing was positive.  UA revealed trace leukocytes.  EKG revealed atrial fibrillation with heart rate of 80 beats per minute.  Chest x-ray revealed no acute abnormality.  CT head without contrast revealed no acute intracranial findings.  Patient was given Lovenox 1mg/kg. Patient was admitted to hospital medicine service for further medical management.        Today's information   Patient seen and examined at bedside, no family member at bedside   Patient has no new complaints   She remains orthostatic will continue with gentle IV fluids   Creatinine back to normal at 0.9, magnesium 1.8, sodium 134, TSH 0.8   Echo with grade 1 diastolic dysfunction   Carotid ultrasound less than 50% stenoses   UA negative, CT head negative, chest x-ray negative         Exam  GENERAL: awake and in no acute distress  LUNGS: CTA anteriorly  CVS: Normal rate  ABD: Soft, non-tender  EXTREMITIES: no LE edema  NEURO: AAOx3  PSYCHIATRIC: Cooperative        ASSESSMENT & PLAN:    Assessment:   New onset atrial fibrillation , cvr , on fd lovenox   Syncope likely d/y covid 19 infection and ivvd  Orthostasis- IVVD   COVID 19 positive   MARY KAY , Resolved   IVVD , improving   Hypokalemia, replete    History of hypertension, hyperlipidemia, and CKD stage IIIA           Plan:  She remains orthostatic will continue with gentle IV fluids   Increase to NS IV at 75 cc/hr  Cardiac monitoring  Renal dose Lovenox   Cardiology recommends p.o. metoprolol tartrate 12.5 mg b.i.d., and they have signed off  COVID 19 contact and droplet precautions   IV Mg 2 g x 1  Resume home medications as deemed appropriate once medication reconciliation is updated  Fall precautions   Labs in AM  PT  Low intensity      VTE Prophylaxis:  Lovenox     Discharge Planning and Disposition:  Pending orthostasis improves        All diagnosis and differential diagnosis have been reviewed; assessment and plan has been documented; I have personally reviewed the labs and test results that are presently available; I have reviewed the patients medication list; I have reviewed the consulting providers response and recommendations. I have reviewed or attempted to review medical records based upon their availability.    All of the patient and family questions have been addressed and answered. Patient's is agreeable to the above stated plan. I will continue to monitor closely and make adjustments to medical management as needed.    VITAL SIGNS: 24 HRS MIN & MAX LAST   Temp  Min: 97.7 °F (36.5 °C)  Max: 98.4 °F (36.9 °C) 98.4 °F (36.9 °C)   BP  Min: 102/63  Max: 116/68 110/71   Pulse  Min: 56  Max: 69  69   Resp  Min: 18  Max: 18 18   SpO2  Min: 93 %  Max: 97 % (!) 93 %     I have reviewed the following labs:  Recent Labs   Lab 08/12/24  0441 08/13/24  0433 08/14/24  0500   WBC 7.19 5.66 5.53   RBC 4.95 4.46* 4.39*   HGB 14.8 13.6* 13.4*   HCT 43.5 39.0* 38.4*   MCV 87.9 87.4 87.5   MCH 29.9 30.5 30.5   MCHC 34.0 34.9 34.9   RDW 12.8 12.7 12.7     206 200   MPV 9.0 9.0 9.4     Recent Labs   Lab 08/12/24  0441 08/13/24  0433 08/14/24  0500 08/15/24  0508   * 133* 132* 135*   K 3.5 3.4* 3.3* 4.1   CL 97* 101 98 101   CO2 24 22* 23 22*   BUN 20.0 19.6 16.7 15.4   CREATININE 1.05 1.00 0.99 0.92   CALCIUM 9.3 9.0 8.9 9.2   MG 2.60 2.10 1.80 2.00   ALBUMIN 3.6 3.4 3.3*  --    ALKPHOS 112 103 97  --    ALT 22 19 20  --    AST 24 20 25  --    BILITOT 0.8 0.7 0.7  --      Microbiology Results (last 7 days)       ** No results found for the last 168 hours. **          _____________________________________________________________________    Malnutrition Status:    Scheduled Med:   apixaban  5 mg Oral BID    aspirin  81 mg Oral Daily    atorvastatin  40 mg Oral QHS    diltiaZEM  360 mg Oral Daily    docusate sodium  50 mg Oral BID    ergocalciferol  50,000 Units Oral Q7 Days    guaiFENesin  600 mg Oral BID    insulin glargine U-100  15 Units Subcutaneous QHS    QUEtiapine  150 mg Oral Nightly      Continuous Infusions:     PRN Meds:    Current Facility-Administered Medications:     acetaminophen, 1,000 mg, Oral, Q6H PRN    aluminum-magnesium hydroxide-simethicone, 30 mL, Oral, QID PRN    bisacodyL, 10 mg, Rectal, Daily PRN    dextrose 10%, 12.5 g, Intravenous, PRN    dextrose 10%, 25 g, Intravenous, PRN    glucagon (human recombinant), 1 mg, Intramuscular, PRN    glucose, 16 g, Oral, PRN    glucose, 24 g, Oral, PRN    guaiFENesin 100 mg/5 ml, 200 mg, Oral, Q4H PRN    HYDROcodone-acetaminophen, 1 tablet, Oral, Q4H PRN    insulin aspart U-100, 1-10 Units, Subcutaneous, QID (AC + HS) PRN    melatonin, 6 mg, Oral, Nightly PRN    naloxone, 0.02 mg, Intravenous, PRN    ondansetron, 4 mg, Intravenous, Q4H PRN    prochlorperazine, 5 mg, Intravenous, Q6H PRN    senna-docusate 8.6-50 mg, 1 tablet, Oral, BID PRN    sodium chloride 0.9%, 10 mL, Intravenous, PRN     Radiology:  I have personally reviewed the following imaging and agree with the radiologist.      Cardiac catheterization  Procedure performed in the Invasive Lab    - See Procedure Log link below for nursing documentation    - See OpNote on Surgeries Tab for physician findings    - See Imaging Tab for radiologist dictation      Niko Lara MD  Department of Hospital Medicine   [facility]   08/18/2024

## 2024-09-07 NOTE — NURSING
Nurses Note -- 4 Eyes      9/6/2024   7:27 PM      Skin assessed during: Q Shift Change      [x] No Altered Skin Integrity Present    []Prevention Measures Documented      [] Yes- Altered Skin Integrity Present or Discovered   [] LDA Added if Not in Epic (Describe Wound)   [] New Altered Skin Integrity was Present on Admit and Documented in LDA   [] Wound Image Taken    Wound Care Consulted? No    Attending Nurse:  Alondra Wang RN/Staff Member:   Yaz TAYLOR

## 2024-09-08 VITALS
DIASTOLIC BLOOD PRESSURE: 70 MMHG | HEART RATE: 60 BPM | OXYGEN SATURATION: 98 % | WEIGHT: 174.81 LBS | HEIGHT: 60 IN | TEMPERATURE: 99 F | BODY MASS INDEX: 34.32 KG/M2 | SYSTOLIC BLOOD PRESSURE: 141 MMHG | RESPIRATION RATE: 18 BRPM

## 2024-09-08 PROBLEM — R55 SYNCOPE: Status: ACTIVE | Noted: 2024-09-08

## 2024-09-08 PROCEDURE — 25000003 PHARM REV CODE 250: Performed by: INTERNAL MEDICINE

## 2024-09-08 RX ORDER — METOPROLOL TARTRATE 25 MG/1
12.5 TABLET, FILM COATED ORAL 2 TIMES DAILY
Qty: 90 TABLET | Refills: 3 | Status: SHIPPED | OUTPATIENT
Start: 2024-09-08 | End: 2025-09-08

## 2024-09-08 RX ADMIN — METOPROLOL TARTRATE 12.5 MG: 25 TABLET, FILM COATED ORAL at 08:09

## 2024-09-08 RX ADMIN — MUPIROCIN: 20 OINTMENT TOPICAL at 08:09

## 2024-09-08 NOTE — NURSING
Pt educated on afib and orthostatic Hypotension. VSS. IV and tele removed. Pt verbalizes understanding and has no further concerns. Home health set up per pt request. Pt will follow up with cardiology and PCP.

## 2024-09-08 NOTE — DISCHARGE SUMMARY
[facility] Hospital Medicine Discharge Summary    Admit Date: 9/5/2024  Discharge Date and Time: 9/8/202411:45 AM  Admitting Physician:  Team  Discharging Physician: Niko Lara MD.  Primary Care Physician: Brianne, Primary Doctor  Consults: Cardiology and Hospital Medicine    Discharge Diagnoses:  New onset atrial fibrillation , cvr , not on AC   Syncope likely d/y covid 19 infection and ivvd  Orthostasis- IVVD , resolved  COVID 19 positive , on room air   MARY KAY , Resolved   IVVD , improving   Hypokalemia, replete    History of hypertension, hyperlipidemia, and CKD stage IIIA       Hospital Course:   Chief Complaint: Fatigue (c/o weakness and collapsing when going to the bathroom, and then vomiting . Witnessed fall by her sitter)    Patient information was obtained from patient, patient's family, past medical records and ER records.     HISTORY OF PRESENT ILLNESS:   Sandhya Meneses is a 83 y.o. female with a past medical history of hypertension, hyperlipidemia, and CKD stage IIIA who presented to [facility] ED on 9/5/2024 with c/o weakness.  Patient reported dizziness while standing followed by syncopal episode.  Patient stated after syncopal episode she vomited.  Caretaker denied patient hitting head.  Initial vital signs in ED were /58, pulse 81, respirations 18, temperature 37° C, and SpO2 100% on room air.  Labs revealed WBC 5.94, sodium 132, potassium 3.4, BUN 19.7, creatinine 1.56, glucose 123, and troponin 0.020.  Flu testing was negative.  COVID testing was positive.  UA revealed trace leukocytes.  EKG revealed atrial fibrillation with heart rate of 80 beats per minute.  Chest x-ray revealed no acute abnormality.  CT head without contrast revealed no acute intracranial findings.  Patient was given Lovenox 1mg/kg. Patient was admitted to hospital medicine service for further medical management.    Patient was dehydrated when she was admitted she was started on gentle IV  fluids.  Orthostatic vitals signs improved on the day of discharge.    Cardiology was consulted given new onset AFib she was recommended to begin p.o. metoprolol 12.5 mg b.i.d., no anticoagulation recommended.    Patient to follow up outpatient with her PCP and cardiologist.      Echo with grade 1 diastolic dysfunction   Carotid ultrasound less than 50% stenoses   UA negative, CT head negative, chest x-ray negative       Pt was seen and examined on the day of discharge  Vitals:  VITAL SIGNS: 24 HRS MIN & MAX LAST   Temp  Min: 98.3 °F (36.8 °C)  Max: 99.1 °F (37.3 °C) 98.3 °F (36.8 °C)   BP  Min: 122/69  Max: 168/76 (!) 137/58   Pulse  Min: 52  Max: 86  62   Resp  Min: 18  Max: 18 18   SpO2  Min: 96 %  Max: 98 % 97 %       Physical Exam:  GENERAL: awake and in no acute distress  LUNGS: CTA anteriorly  CVS: Normal rate  ABD: Soft, non-tender  EXTREMITIES: no LE edema  NEURO: AAOx3  PSYCHIATRIC: Cooperative        Procedures Performed: No admission procedures for hospital encounter.     Significant Diagnostic Studies: See Full reports for all details    Recent Labs   Lab 09/05/24  1506 09/07/24  0534   WBC 5.94 5.79   RBC 4.50 4.25   HGB 13.3 12.4   HCT 39.3 37.7   MCV 87.3 88.7   MCH 29.6 29.2   MCHC 33.8 32.9*   RDW 13.7 14.0    170   MPV 9.6 9.8       Recent Labs   Lab 09/05/24  1506 09/07/24  0534   * 134*   K 3.4* 4.7    109*   CO2 23 20*   BUN 19.7 14.1   CREATININE 1.56* 0.99   CALCIUM 9.5 9.1   MG 2.00 1.80   ALBUMIN 3.4 3.1*   ALKPHOS 48 46   ALT 7 5   AST 17 16   BILITOT 0.4 0.6        Microbiology Results (last 7 days)       ** No results found for the last 168 hours. **             CV Ultrasound Bilateral Doppler Carotid  The right internal carotid artery was patent with less than 50% stenosis.   The left internal carotid artery was patent with less than 50% stenosis.   Bilateral vertebral arteries were patent with antegrade flow.  Echo    Left Ventricle: The left ventricle is normal in  size. Mildly increased   wall thickness. There is normal systolic function with a visually   estimated ejection fraction of 55 - 60%. Grade I diastolic dysfunction.    Right Ventricle: Normal right ventricular cavity size. Wall thickness   is normal. Systolic function is normal.    Left Atrium: Left atrium is mildly dilated.    Tricuspid Valve: There is physiologically normal regurgitation.    IVC/SVC: Normal venous pressure at 3 mmHg.         Medication List        START taking these medications      metoprolol tartrate 25 MG tablet  Commonly known as: LOPRESSOR  Take 0.5 tablets (12.5 mg total) by mouth 2 (two) times daily.            CONTINUE taking these medications      lovastatin 10 MG tablet  Commonly known as: MEVACOR            STOP taking these medications      lisinopriL-hydrochlorothiazide 20-12.5 mg per tablet  Commonly known as: PRINZIDE,ZESTORETIC               Where to Get Your Medications        These medications were sent to [facility]/pharmacy #[id number] - [place] [address]      Phone: [phone]   metoprolol tartrate 25 MG tablet          Explained in detail to the patient about the discharge plan, medications, and follow-up visits. Pt understands and agrees with the treatment plan  Discharge Disposition:    Discharged Condition: stable  Diet-   Dietary Orders (From admission, onward)       Start     Ordered    09/07/24 1936  Dietary nutrition supplements All Meals; Boost Original Nutritional Drink - Vanilla  Continuous        Question Answer Comment   Frequency: All Meals    Select PO Supplement: Boost Original Nutritional Drink - Vanilla        09/07/24 1935    09/05/24 1601  Diet Heart Healthy  (Diet/Nutrition - [facility])  Diet effective now         09/05/24 1600                   Medications Per DC med rec  Activities as tolerated   Follow-up Information       No, Primary Doctor. Schedule an appointment as soon as  possible for a visit in 2 week(s).               Raudel Hidalgo MD Follow up in 3 week(s).    Specialty: Cardiology  Why: f/up new onset a fibrillation  Contact information:  [address] [place] [address]  [place] [id number] [phone]                           For further questions contact hospitalist office    Discharge time 33 minutes    For worsening symptoms, chest pain, shortness of breath, increased abdominal pain, high grade fever, stroke or stroke like symptoms, immediately go to the nearest Emergency Room or call 911 as soon as possible.      Niko Man M.D on 9/8/2024. at 11:45 AM.

## 2024-09-08 NOTE — PLAN OF CARE
09/08/24 1223   Final Note   Assessment Type Final Discharge Note   Anticipated Discharge Disposition Home   Post-Acute Status   Discharge Delays None known at this time     Pt will dc to home , has sitter 1x week . No needs noted for CM

## 2024-09-08 NOTE — PLAN OF CARE
Pt son at  and is requesting  2000 be set up, referral to  as requested. He also wants copy of order or the referral     Copy of HH order given     Son had questions regarding why pt was dc with no sitter order. I explained that pt reports she has sitter on Thursdays. , hospital not responsible for sitter services. I offered brochures on sitter services . Pt son not interested . I also informed HH does not have sitters. All questions answered. He had medication questions, I informed him pt nurse can answer these questions. Nurse Yaz answered his questions.

## 2024-09-12 ENCOUNTER — TELEPHONE (OUTPATIENT)
Dept: FAMILY MEDICINE | Facility: CLINIC | Age: 84
End: 2024-09-12
Payer: MEDICARE

## 2024-09-12 NOTE — TELEPHONE ENCOUNTER
This is a new patient scheduled too far out for a hospital discharge follow up. Please move up appt

## 2024-09-16 ENCOUNTER — TELEPHONE (OUTPATIENT)
Dept: FAMILY MEDICINE | Facility: CLINIC | Age: 84
End: 2024-09-16

## 2024-09-16 ENCOUNTER — OFFICE VISIT (OUTPATIENT)
Dept: FAMILY MEDICINE | Facility: CLINIC | Age: 84
End: 2024-09-16
Payer: MEDICARE

## 2024-09-16 VITALS
DIASTOLIC BLOOD PRESSURE: 62 MMHG | WEIGHT: 173.5 LBS | TEMPERATURE: 99 F | SYSTOLIC BLOOD PRESSURE: 138 MMHG | HEART RATE: 60 BPM | BODY MASS INDEX: 27.88 KG/M2 | OXYGEN SATURATION: 97 % | RESPIRATION RATE: 20 BRPM | HEIGHT: 66 IN

## 2024-09-16 DIAGNOSIS — Z85.528 HISTORY OF RENAL CELL CARCINOMA: ICD-10-CM

## 2024-09-16 DIAGNOSIS — I10 PRIMARY HYPERTENSION: ICD-10-CM

## 2024-09-16 DIAGNOSIS — N18.31 STAGE 3A CHRONIC KIDNEY DISEASE: ICD-10-CM

## 2024-09-16 DIAGNOSIS — I48.0 PAROXYSMAL ATRIAL FIBRILLATION: ICD-10-CM

## 2024-09-16 DIAGNOSIS — Z09 HOSPITAL DISCHARGE FOLLOW-UP: Primary | ICD-10-CM

## 2024-09-16 DIAGNOSIS — E78.2 MIXED HYPERLIPIDEMIA: ICD-10-CM

## 2024-09-16 PROBLEM — Z90.5 ACQUIRED ABSENCE OF KIDNEY: Status: ACTIVE | Noted: 2020-10-19

## 2024-09-16 PROBLEM — N18.30 STAGE 3 CHRONIC KIDNEY DISEASE: Status: ACTIVE | Noted: 2020-10-19

## 2024-09-16 PROCEDURE — 99214 OFFICE O/P EST MOD 30 MIN: CPT | Mod: ,,, | Performed by: FAMILY MEDICINE

## 2024-09-16 NOTE — PROGRESS NOTES
Sandhya Meneses  09/16/2024  [id number]    Lita Jacobs MD  Patient Care Team:  Lita Jacobs MD as PCP - General (Family Medicine)  Richie Richardson MD as Consulting Physician (Cardiology)      Chief Complaint:  Chief Complaint   Patient presents with    Establish Care     Needs PCP-Hospital Discharge Follow Up       History of Present Illness:    83 y.o. female who presents today to establish care after a hospital admission. Son is present with patient today. She was admitted to hospital after a syncopal episode at home. Was found to have new onset A-fib in the light of having covid. She remains to have dyspnea with exertion but appetite is improving. NO more syncopal episodes. No new complaints. Has f/u appt coming up.    Admit Date: 9/5/2024  Discharge Date and Time: 9/8/202411:45 AM  Admitting Physician:  Team  Discharging Physician: Niko Lara MD.  Primary Care Physician: Brianne, Primary Doctor  Consults: Cardiology and Hospital Medicine     Discharge Diagnoses:  New onset atrial fibrillation , cvr , not on AC   Syncope likely d/y covid 19 infection and ivvd  Orthostasis- IVVD , resolved  COVID 19 positive , on room air   MARY KAY , Resolved   IVVD , improving   Hypokalemia, replete    History of hypertension, hyperlipidemia, and CKD stage IIIA        Hospital Course:   Chief Complaint: Fatigue (c/o weakness and collapsing when going to the bathroom, and then vomiting . Witnessed fall by her sitter)     Patient information was obtained from patient, patient's family, past medical records and ER records.     HISTORY OF PRESENT ILLNESS:   Sandhya Meneses is a 83 y.o. female with a past medical history of hypertension, hyperlipidemia, and CKD stage IIIA who presented to [facility] ED on 9/5/2024 with c/o weakness.  Patient reported dizziness while standing followed by syncopal episode.  Patient stated after syncopal episode she vomited.  Caretaker denied patient hitting head.  Initial vital  signs in ED were /58, pulse 81, respirations 18, temperature 37° C, and SpO2 100% on room air.  Labs revealed WBC 5.94, sodium 132, potassium 3.4, BUN 19.7, creatinine 1.56, glucose 123, and troponin 0.020.  Flu testing was negative.  COVID testing was positive.  UA revealed trace leukocytes.  EKG revealed atrial fibrillation with heart rate of 80 beats per minute.  Chest x-ray revealed no acute abnormality.  CT head without contrast revealed no acute intracranial findings.  Patient was given Lovenox 1mg/kg. Patient was admitted to hospital medicine service for further medical management.    Patient was dehydrated when she was admitted she was started on gentle IV fluids.  Orthostatic vitals signs improved on the day of discharge.    Cardiology was consulted given new onset AFib she was recommended to begin p.o. metoprolol 12.5 mg b.i.d., no anticoagulation recommended.    Patient to follow up outpatient with her PCP and cardiologist.        Echo with grade 1 diastolic dysfunction   Carotid ultrasound less than 50% stenoses   UA negative, CT head negative, chest x-ray negativ    Family and/or Caretaker present at visit?  Yes.  Diagnostic tests reviewed/disposition: No diagnosic tests pending after this hospitalization.  Disease/illness education: yes  Home health/community services discussion/referrals: Patient does not have home health established from hospital visit.  They do not need home health.  If needed, we will set up home health for the patient.   Establishment or re-establishment of referral orders for community resources: No other necessary community resources.   Discussion with other health care providers: No discussion with other health care providers necessary.  I reviewed and reconciled the medications from hospital discharge.      Review of Systems  General: denies f/c, weight loss, night sweats, decreased appetite  Eye: denies blurred vision, changes in vision  Respiratory: denies wheezing,  "cough  Cardiovascular: denies chest pain, palpitations, edema  Gastrointestinal: denies abdominal pain, n/v, constipation, diarrhea  Integumentary: denies rashes, pruritis    Past Medical History  Past Medical History:   Diagnosis Date    Hyperlipidemia     Hypertension        Medications  Medication List with Changes/Refills   Current Medications    LOVASTATIN (MEVACOR) 10 MG TABLET    Take 10 mg by mouth every evening.    METOPROLOL TARTRATE (LOPRESSOR) 25 MG TABLET    Take 0.5 tablets (12.5 mg total) by mouth 2 (two) times daily.       Past Surgical History:   Procedure Laterality Date    NEPHRECTOMY         SUBJECTIVE:  Health Maintenance  The patient has no Health Maintenance topics of status Not Due  Health Maintenance Due   Topic Date Due    Lipid Panel  Never done    TETANUS VACCINE  Never done    Shingles Vaccine (1 of 2) Never done    RSV Vaccine (Age 60+ and Pregnant patients) (1 - 1-dose 60+ series) Never done    Pneumococcal Vaccines (Age 65+) (1 of 1 - PCV) Never done    DEXA Scan  01/30/2022    Influenza Vaccine (1) 09/01/2024    COVID-19 Vaccine (6 - 2023-24 season) 09/01/2024       Exam:  Vitals:    09/16/24 1435   BP: 138/62   BP Location: Left arm   Patient Position: Sitting   BP Method: Large (Manual)   Pulse: 60   Resp: 20   Temp: 99.1 °F (37.3 °C)   TempSrc: Temporal   SpO2: 97%   Weight: 78.7 kg (173 lb 8 oz)   Height: 5' 6" (1.676 m)     Weight: 78.7 kg (173 lb 8 oz)   Body mass index is 28 kg/m².      Physical Exam  Constitutional: NAD, alert, pleasant  Respiratory: CTAB, no wheezes, rales or rhonchi. No accessory muscle use  Eyes: EOMI  Cardiovascular: RRR, No m/r/g. No JVD. No LE edema  Gastrointestinal: BS+, nontender, nondistended  Integumentary: warm, dry, intact  Psych: AA&Ox3      ICD-10-CM ICD-9-CM   1. Hospital discharge follow-up  Z09 V67.59   2. Mixed hyperlipidemia  E78.2 272.2   3. Stage 3a chronic kidney disease  N18.31 585.3   4. Paroxysmal atrial fibrillation  I48.0 427.31 "   5. History of renal cell carcinoma  Z85.528 V10.52   6. Primary hypertension  I10 401.9       1. Hospital discharge follow-up    2. Mixed hyperlipidemia  Overview:  On lovastatin 10 mg daily. NO recent lipids to review    Rtc with labs      3. Stage 3a chronic kidney disease  Overview:  Followed by nephrology. Renal indices are stable.     Continue to f/u with nephrology      4. Paroxysmal atrial fibrillation  Overview:  Rate controlled with metoprolol 12.5 mg bid. Followed by cardiology. Asymptomatic    Continue current Rx meds        5. History of renal cell carcinoma  Overview:  S/o total right nephrectomy 2018      6. Primary hypertension  Overview:  Bp at goal on metoprolol 12.5 mg bid. Asymptomatic    Continue current Rx meds           Continue to hydrate with water  Patient does not plan to establish care here as she already has a PCP at [facility] so I will defer further management to her pcp.   Er precautions given  F/u with cardiologist as scheduled.  Follow up: No follow-ups on file.      Care Plan/Goals: Reviewed   Goals    None

## 2024-11-01 ENCOUNTER — LAB VISIT (OUTPATIENT)
Dept: LAB | Facility: HOSPITAL | Age: 84
End: 2024-11-01
Attending: INTERNAL MEDICINE
Payer: MEDICARE

## 2024-11-01 DIAGNOSIS — N18.31 CHRONIC KIDNEY DISEASE (CKD) STAGE G3A/A1, MODERATELY DECREASED GLOMERULAR FILTRATION RATE (GFR) BETWEEN 45-59 ML/MIN/1.73 SQUARE METER AND ALBUMINURIA CREATININE RATIO LESS THAN 30 MG/G: Primary | ICD-10-CM

## 2024-11-01 DIAGNOSIS — Z90.5 ACQUIRED ABSENCE OF KIDNEY: ICD-10-CM

## 2024-11-01 DIAGNOSIS — I10 ESSENTIAL HYPERTENSION, MALIGNANT: ICD-10-CM

## 2024-11-01 DIAGNOSIS — E78.2 MIXED HYPERLIPIDEMIA: ICD-10-CM

## 2024-11-01 LAB
ALBUMIN SERPL-MCNC: 3.4 G/DL (ref 3.4–4.8)
ALBUMIN/GLOB SERPL: 1 RATIO (ref 1.1–2)
ALP SERPL-CCNC: 62 UNIT/L (ref 40–150)
ALT SERPL-CCNC: 8 UNIT/L (ref 0–55)
ANION GAP SERPL CALC-SCNC: 5 MEQ/L
AST SERPL-CCNC: 16 UNIT/L (ref 5–34)
BILIRUB SERPL-MCNC: 0.4 MG/DL
BUN SERPL-MCNC: 13.9 MG/DL (ref 9.8–20.1)
CALCIUM SERPL-MCNC: 9.1 MG/DL (ref 8.4–10.2)
CHLORIDE SERPL-SCNC: 111 MMOL/L (ref 98–107)
CO2 SERPL-SCNC: 25 MMOL/L (ref 23–31)
CREAT SERPL-MCNC: 1.1 MG/DL (ref 0.55–1.02)
CREAT/UREA NIT SERPL: 13
GFR SERPLBLD CREATININE-BSD FMLA CKD-EPI: 50 ML/MIN/1.73/M2
GLOBULIN SER-MCNC: 3.3 GM/DL (ref 2.4–3.5)
GLUCOSE SERPL-MCNC: 89 MG/DL (ref 82–115)
POTASSIUM SERPL-SCNC: 3.6 MMOL/L (ref 3.5–5.1)
PROT SERPL-MCNC: 6.7 GM/DL (ref 5.8–7.6)
SODIUM SERPL-SCNC: 141 MMOL/L (ref 136–145)
URATE SERPL-MCNC: 5.9 MG/DL (ref 2.6–6)

## 2024-11-01 PROCEDURE — 84550 ASSAY OF BLOOD/URIC ACID: CPT

## 2024-11-01 PROCEDURE — 80053 COMPREHEN METABOLIC PANEL: CPT

## 2024-11-01 PROCEDURE — 36415 COLL VENOUS BLD VENIPUNCTURE: CPT

## 2025-03-07 ENCOUNTER — HOSPITAL ENCOUNTER (EMERGENCY)
Facility: HOSPITAL | Age: 85
Discharge: HOME | End: 2025-03-08
Attending: STUDENT IN AN ORGANIZED HEALTH CARE EDUCATION/TRAINING PROGRAM
Payer: MEDICARE

## 2025-03-07 DIAGNOSIS — I10 HYPERTENSION, UNSPECIFIED TYPE: ICD-10-CM

## 2025-03-07 DIAGNOSIS — R41.82 AMS (ALTERED MENTAL STATUS): Primary | ICD-10-CM

## 2025-03-07 LAB
ALBUMIN SERPL-MCNC: 3.5 G/DL (ref 3.4–4.8)
ALBUMIN/GLOB SERPL: 0.9 RATIO (ref 1.1–2)
ALP SERPL-CCNC: 60 UNIT/L (ref 40–150)
ALT SERPL-CCNC: 8 UNIT/L (ref 0–55)
ANION GAP SERPL CALC-SCNC: 8 MEQ/L
AST SERPL-CCNC: 21 UNIT/L (ref 5–34)
BASOPHILS # BLD AUTO: 0.03 X10(3)/MCL
BASOPHILS NFR BLD AUTO: 0.6 %
BILIRUB SERPL-MCNC: 0.5 MG/DL
BUN SERPL-MCNC: 12.9 MG/DL (ref 9.8–20.1)
CALCIUM SERPL-MCNC: 9.3 MG/DL (ref 8.4–10.2)
CHLORIDE SERPL-SCNC: 106 MMOL/L (ref 98–107)
CO2 SERPL-SCNC: 26 MMOL/L (ref 23–31)
CREAT SERPL-MCNC: 1.22 MG/DL (ref 0.55–1.02)
CREAT/UREA NIT SERPL: 11
EOSINOPHIL # BLD AUTO: 0.12 X10(3)/MCL (ref 0–0.9)
EOSINOPHIL NFR BLD AUTO: 2.5 %
ERYTHROCYTE [DISTWIDTH] IN BLOOD BY AUTOMATED COUNT: 14.7 % (ref 11.5–17)
FLUAV AG UPPER RESP QL IA.RAPID: NOT DETECTED
FLUBV AG UPPER RESP QL IA.RAPID: NOT DETECTED
GFR SERPLBLD CREATININE-BSD FMLA CKD-EPI: 44 ML/MIN/1.73/M2
GLOBULIN SER-MCNC: 3.8 GM/DL (ref 2.4–3.5)
GLUCOSE SERPL-MCNC: 96 MG/DL (ref 82–115)
HCT VFR BLD AUTO: 42.5 % (ref 37–47)
HGB BLD-MCNC: 14 G/DL (ref 12–16)
IMM GRANULOCYTES # BLD AUTO: 0.03 X10(3)/MCL (ref 0–0.04)
IMM GRANULOCYTES NFR BLD AUTO: 0.6 %
LYMPHOCYTES # BLD AUTO: 1.81 X10(3)/MCL (ref 0.6–4.6)
LYMPHOCYTES NFR BLD AUTO: 37.9 %
MCH RBC QN AUTO: 29.5 PG (ref 27–31)
MCHC RBC AUTO-ENTMCNC: 32.9 G/DL (ref 33–36)
MCV RBC AUTO: 89.5 FL (ref 80–94)
MONOCYTES # BLD AUTO: 0.5 X10(3)/MCL (ref 0.1–1.3)
MONOCYTES NFR BLD AUTO: 10.5 %
NEUTROPHILS # BLD AUTO: 2.28 X10(3)/MCL (ref 2.1–9.2)
NEUTROPHILS NFR BLD AUTO: 47.9 %
NRBC BLD AUTO-RTO: 0 %
PLATELET # BLD AUTO: 165 X10(3)/MCL (ref 130–400)
PLATELETS.RETICULATED NFR BLD AUTO: 2.8 % (ref 0.9–11.2)
PMV BLD AUTO: 12.2 FL (ref 7.4–10.4)
POTASSIUM SERPL-SCNC: 4.1 MMOL/L (ref 3.5–5.1)
PROT SERPL-MCNC: 7.3 GM/DL (ref 5.8–7.6)
RBC # BLD AUTO: 4.75 X10(6)/MCL (ref 4.2–5.4)
SARS-COV-2 RNA RESP QL NAA+PROBE: NOT DETECTED
SODIUM SERPL-SCNC: 140 MMOL/L (ref 136–145)
TROPONIN I SERPL-MCNC: 0.01 NG/ML (ref 0–0.04)
WBC # BLD AUTO: 4.77 X10(3)/MCL (ref 4.5–11.5)

## 2025-03-07 PROCEDURE — 85025 COMPLETE CBC W/AUTO DIFF WBC: CPT

## 2025-03-07 PROCEDURE — 0240U COVID/FLU A&B PCR: CPT | Performed by: STUDENT IN AN ORGANIZED HEALTH CARE EDUCATION/TRAINING PROGRAM

## 2025-03-07 PROCEDURE — 80053 COMPREHEN METABOLIC PANEL: CPT

## 2025-03-07 PROCEDURE — 93005 ELECTROCARDIOGRAM TRACING: CPT

## 2025-03-07 PROCEDURE — 84484 ASSAY OF TROPONIN QUANT: CPT

## 2025-03-07 PROCEDURE — 93010 ELECTROCARDIOGRAM REPORT: CPT | Mod: ,,, | Performed by: INTERNAL MEDICINE

## 2025-03-07 PROCEDURE — 99285 EMERGENCY DEPT VISIT HI MDM: CPT | Mod: 25

## 2025-03-08 VITALS
OXYGEN SATURATION: 97 % | HEART RATE: 68 BPM | SYSTOLIC BLOOD PRESSURE: 168 MMHG | WEIGHT: 175 LBS | HEIGHT: 65 IN | DIASTOLIC BLOOD PRESSURE: 67 MMHG | BODY MASS INDEX: 29.16 KG/M2 | RESPIRATION RATE: 19 BRPM | TEMPERATURE: 97 F

## 2025-03-08 LAB
BACTERIA #/AREA URNS AUTO: ABNORMAL /HPF
BILIRUB UR QL STRIP.AUTO: NEGATIVE
CLARITY UR: CLEAR
COLOR UR AUTO: COLORLESS
GLUCOSE UR QL STRIP: NORMAL
HGB UR QL STRIP: ABNORMAL
KETONES UR QL STRIP: NEGATIVE
LEUKOCYTE ESTERASE UR QL STRIP: NEGATIVE
NITRITE UR QL STRIP: NEGATIVE
OHS QRS DURATION: 56 MS
OHS QTC CALCULATION: 454 MS
PH UR STRIP: 7.5 [PH]
PROT UR QL STRIP: NEGATIVE
RBC #/AREA URNS AUTO: ABNORMAL /HPF
SP GR UR STRIP.AUTO: 1.01 (ref 1–1.03)
SQUAMOUS #/AREA URNS LPF: ABNORMAL /HPF
UROBILINOGEN UR STRIP-ACNC: NORMAL
WBC #/AREA URNS AUTO: ABNORMAL /HPF

## 2025-03-08 PROCEDURE — 96374 THER/PROPH/DIAG INJ IV PUSH: CPT

## 2025-03-08 PROCEDURE — 25000003 PHARM REV CODE 250: Performed by: STUDENT IN AN ORGANIZED HEALTH CARE EDUCATION/TRAINING PROGRAM

## 2025-03-08 PROCEDURE — 81015 MICROSCOPIC EXAM OF URINE: CPT

## 2025-03-08 PROCEDURE — 96361 HYDRATE IV INFUSION ADD-ON: CPT

## 2025-03-08 PROCEDURE — 63600175 PHARM REV CODE 636 W HCPCS: Performed by: STUDENT IN AN ORGANIZED HEALTH CARE EDUCATION/TRAINING PROGRAM

## 2025-03-08 RX ORDER — LABETALOL HYDROCHLORIDE 5 MG/ML
5 INJECTION, SOLUTION INTRAVENOUS
Status: COMPLETED | OUTPATIENT
Start: 2025-03-08 | End: 2025-03-08

## 2025-03-08 RX ADMIN — LABETALOL HYDROCHLORIDE 5 MG: 5 INJECTION, SOLUTION INTRAVENOUS at 04:03

## 2025-03-08 RX ADMIN — SODIUM CHLORIDE 1000 ML: 9 INJECTION, SOLUTION INTRAVENOUS at 03:03

## 2025-03-08 RX ADMIN — SODIUM CHLORIDE, POTASSIUM CHLORIDE, SODIUM LACTATE AND CALCIUM CHLORIDE 1000 ML: 600; 310; 30; 20 INJECTION, SOLUTION INTRAVENOUS at 12:03

## 2025-03-08 NOTE — ED PROVIDER NOTES
Encounter Date: 3/7/2025    SCRIBE #1 NOTE: I, Donte Self, am scribing for, and in the presence of,  Delvis Gruber MD. I have scribed the following portions of the note - Other sections scribed: HPI, ROS, PE.       History     Chief Complaint   Patient presents with    Altered Mental Status     Arrives with [facility] unit [facility]. New onset confusion that started around 5PM. EMS reported GCS 14. Currently GCS 15 on arrival. Answers all questions correctly in triage. Denies any recent falls or trauma. Family reports to EMS patient couldn't remember how to dial phone. Patient lives alone.      Patient is an 84 y.o. female with a PMHx of HLD and HTN presenting to the ED for altered mental status that onset at 1700 today. Per EMS, patient was reportedly GCS 14 and could not remember how to dial her phone so the family called them.     Patient states she is feeling okay and denies any complaints. Patient specifically denies any SOB, fever, or n/v. Patient says she lives alone.     The history is provided by the patient and the EMS personnel. No  was used.     Review of patient's allergies indicates:  No Known Allergies  Past Medical History:   Diagnosis Date    Hyperlipidemia     Hypertension      Past Surgical History:   Procedure Laterality Date    NEPHRECTOMY       Family History   Problem Relation Name Age of Onset    Heart disease Mother      Liver disease Father       Social History[1]  Review of Systems   Constitutional:  Negative for fever.   HENT:  Negative for sore throat.    Eyes:  Negative for visual disturbance.   Respiratory:  Negative for shortness of breath.    Cardiovascular:  Negative for chest pain.   Gastrointestinal:  Negative for abdominal pain, nausea and vomiting.   Genitourinary:  Negative for dysuria.   Musculoskeletal:  Negative for joint swelling.   Skin:  Negative for rash.   Neurological:  Negative for weakness.   Psychiatric/Behavioral:  Positive for confusion.         Physical Exam     Initial Vitals [03/07/25 2034]   BP Pulse Resp Temp SpO2   (!) 198/118 71 16 97 °F (36.1 °C) 100 %      MAP       --         Physical Exam    Nursing note and vitals reviewed.  Constitutional: She appears well-developed and well-nourished.   HENT:   Head: Normocephalic and atraumatic.   Eyes: EOM are normal. Pupils are equal, round, and reactive to light.   Neck:   Normal range of motion.  Cardiovascular:  Normal rate, regular rhythm, normal heart sounds and intact distal pulses.           No murmur heard.  Pulmonary/Chest: Breath sounds normal. No respiratory distress. She has no wheezes. She has no rales.   Abdominal: Abdomen is soft. She exhibits no distension. There is no abdominal tenderness. There is no rebound.   Musculoskeletal:         General: No tenderness or edema. Normal range of motion.      Cervical back: Normal range of motion.     Neurological: She is alert and oriented to person, place, and time. She has normal strength. No cranial nerve deficit. GCS score is 15. GCS eye subscore is 4. GCS verbal subscore is 5. GCS motor subscore is 6.   Skin: Skin is warm and dry. Capillary refill takes less than 2 seconds. No rash noted. No erythema.   Psychiatric: She has a normal mood and affect.         ED Course   Procedures  Labs Reviewed   COMPREHENSIVE METABOLIC PANEL - Abnormal       Result Value    Sodium 140      Potassium 4.1      Chloride 106      CO2 26      Glucose 96      Blood Urea Nitrogen 12.9      Creatinine 1.22 (*)     Calcium 9.3      Protein Total 7.3      Albumin 3.5      Globulin 3.8 (*)     Albumin/Globulin Ratio 0.9 (*)     Bilirubin Total 0.5      ALP 60      ALT 8      AST 21      eGFR 44      Anion Gap 8.0      BUN/Creatinine Ratio 11     URINALYSIS, REFLEX TO URINE CULTURE - Abnormal    Color, UA Colorless      Appearance, UA Clear      Specific Gravity, UA 1.007      pH, UA 7.5      Protein, UA Negative      Glucose, UA Normal      Ketones, UA Negative       Blood, UA 1+ (*)     Bilirubin, UA Negative      Urobilinogen, UA Normal      Nitrites, UA Negative      Leukocyte Esterase, UA Negative      RBC, UA 0-5      WBC, UA 0-5      Bacteria, UA None Seen      Squamous Epithelial Cells, UA Trace     CBC WITH DIFFERENTIAL - Abnormal    WBC 4.77      RBC 4.75      Hgb 14.0      Hct 42.5      MCV 89.5      MCH 29.5      MCHC 32.9 (*)     RDW 14.7      Platelet 165      MPV 12.2 (*)     IPF 2.8      Neut % 47.9      Lymph % 37.9      Mono % 10.5      Eos % 2.5      Basophil % 0.6      Imm Grans % 0.6      Neut # 2.28      Lymph # 1.81      Mono # 0.50      Eos # 0.12      Baso # 0.03      Imm Gran # 0.03      NRBC% 0.0     TROPONIN I - Normal    Troponin-I 0.011     COVID/FLU A&B PCR - Normal    Influenza A PCR Not Detected      Influenza B PCR Not Detected      SARS-CoV-2 PCR Not Detected      Narrative:     The Xpert Xpress SARS-CoV-2/FLU/RSV plus is a rapid, multiplexed real-time PCR test intended for the simultaneous qualitative detection and differentiation of SARS-CoV-2, Influenza A, Influenza B, and respiratory syncytial virus (RSV) viral RNA in either nasopharyngeal swab or nasal swab specimens.         CBC W/ AUTO DIFFERENTIAL    Narrative:     The following orders were created for panel order CBC auto differential.  Procedure                               Abnormality         Status                     ---------                               -----------         ------                     CBC with Differential[[id number]]       Abnormal            Final result                 Please view results for these tests on the individual orders.     EKG Readings: (Independently Interpreted)   Sinus rhythm with PACs.  Rate of 69.  No STEMI.     ECG Results              EKG 12-lead (Final result)        Collection Time Result Time QRS Duration OHS QTC Calculation    03/07/25 21:01:19 03/08/25 17:25:33 56 454                     Final result by Interface, Lab In [facility] (03/08/25  17:25:38)                   Narrative:    Test Reason : R41.82,    Vent. Rate :  69 BPM     Atrial Rate :  69 BPM     P-R Int : 164 ms          QRS Dur :  56 ms      QT Int : 424 ms       P-R-T Axes :  76  86 263 degrees    QTcB Int : 454 ms    Sinus rhythm with Premature atrial complexes with Aberrant conduction  Septal infarct ,age undetermined  T wave abnormality, consider inferolateral ischemia  Abnormal ECG    Confirmed by Tasha Hurtado ([id number]) on 3/8/2025 5:25:31 PM    Referred By:            Confirmed By: Tasha Hurtado                                     EKG 12-lead (Final result)  Result time 03/13/25 12:32:01      Final result by Unknown User (03/13/25 12:32:01)                                      Imaging Results              X-Ray Chest AP Portable (Final result)  Result time 03/07/25 22:48:30      Final result by Vijay López MD (03/07/25 22:48:30)                   Impression:      No acute disease is seen      Electronically signed by: Vijay López MD  Date:    03/07/2025  Time:    22:48               Narrative:    EXAMINATION:  XR CHEST AP PORTABLE    CLINICAL HISTORY:  Altered mental status, unspecified    TECHNIQUE:  Single frontal view of the chest was performed.    COMPARISON:  09/05/2024    FINDINGS:  No infiltrates are seen.  Heart size is within normal limits.  Costophrenic angles are clear.  There is vascular calcification noted.                                       CT Head Without Contrast (Final result)  Result time 03/07/25 22:02:35      Final result by Donald Ward MD (03/07/25 22:02:35)                   Impression:      1.  No acute intracranial findings identified.    2.  Old lacunar infarct, chronic microangiopathic ischemia and atrophy..      Electronically signed by: Donald Ward  Date:    03/07/2025  Time:    22:02               Narrative:    EXAMINATION:  CT HEAD WITHOUT CONTRAST    CLINICAL HISTORY:  Mental status change, unknown cause;    TECHNIQUE:  Sequential  axial images were performed of the brain without contrast.    .    Dose length product was 1040 mGycm. Automated exposure control was utilized to minimize radiation dose.    COMPARISON:  September 5, 2024..    FINDINGS:  There is no intracranial mass effect, midline shift, hydrocephalus or hemorrhage. There is no sulcal effacement or low attenuation changes to suggest recent large vessel territory infarction.  There is left thalamic old lacunar infarct.  Chronic appearing periventricular and subcortical white matter low attenuation changes are present and are consistent with chronic microangiopathic ischemia. The ventricular system and sulcal markings prominence is consistent with atrophy. There is no acute extra axial fluid collection. Visualized paranasal sinuses are clear without mucosal thickening, polypoidal abnormality or air-fluid levels. Mastoid air cells aeration is optimal.                                       Medications   lactated ringers bolus 1,000 mL (0 mLs Intravenous Stopped 3/8/25 0109)   sodium chloride 0.9% bolus 1,000 mL 1,000 mL (0 mLs Intravenous Stopped 3/8/25 0400)   labetaloL injection 5 mg (5 mg Intravenous Given 3/8/25 0428)     Medical Decision Making  Judging by the patient's chief complaint and pertinent history, the patient has the following possible differential diagnoses, including but not limited to the following.  Some of these are deemed to be lower likelihood and some more likely based on my physical exam and history combined with possible lab work and/or imaging studies.   Please see the pertinent studies, and refer to the HPI.  Some of these diagnoses will take further evaluation to fully rule out, perhaps as an outpatient and the patient was encouraged to follow up when discharged for more comprehensive evaluation.    Metabolic abnormality, CVA, infection, structural (SAH, ICH, trauma, neoplastic), seizure/postictal, polypharmacy     Patient is a 84-year-old female presents  to emergency department for some confusion that occurred earlier today.  She states she can not remember how to dial a phone.  Her symptoms have since resolved.  Denies any current complaints.  Denies any confusion, weakness, numbness, fever, chest pain, shortness of breath, nausea, vomiting, abdominal pain, or any other concerns.  CT of the head without any acute abnormalities.  EKG as noted.  Shared decision making used to determine disposition.  Given patient at her baseline, reasonable to continue outpatient management strict return precautions.  Patient and family verbalized understanding and agreed to plan.    Problems Addressed:  AMS (altered mental status): acute illness or injury that poses a threat to life or bodily functions  Hypertension, unspecified type: acute illness or injury that poses a threat to life or bodily functions    Amount and/or Complexity of Data Reviewed  Independent Historian: EMS     Details: See HPI  Labs: ordered.  Radiology: ordered.    Risk  Prescription drug management.  Parenteral controlled substances.  Decision regarding hospitalization.            Scribe Attestation:   Scribe #1: I performed the above scribed service and the documentation accurately describes the services I performed. I attest to the accuracy of the note.    Attending Attestation:           Physician Attestation for Scribe:  Physician Attestation Statement for Scribe #1: I, Delvis Gruber MD, reviewed documentation, as scribed by Donte Self in my presence, and it is both accurate and complete.                                    Clinical Impression:  Final diagnoses:  [R41.82] AMS (altered mental status) (Primary)  [I10] Hypertension, unspecified type          ED Disposition Condition    Discharge Stable          ED Prescriptions    None       Follow-up Information       Follow up With Specialties Details Why Contact Info    Primary Care  Call in 1 day  Please call [phone] for a primary care provider.                  [1]   Social History  Tobacco Use    Smoking status: Never    Smokeless tobacco: Never   Substance Use Topics    Alcohol use: Yes    Drug use: Never        Delvis Gruber MD  03/31/25 0944

## 2025-03-08 NOTE — DISCHARGE INSTRUCTIONS
Follow-up with the primary care physician.      Continue taking your blood pressure medication as prescribed.    Return to the emergency department if any new or worsening symptoms.

## 2025-03-08 NOTE — FIRST PROVIDER EVALUATION
"Medical screening examination initiated.  I have conducted a focused provider triage encounter, findings are as follows:    Brief history of present illness:  arrived to ED via [facility] due to AMS. Patient felt disoriented and had a transient episode of slurred speech while on the phone with family that resolved. GCS 15 currently.     Vitals:    03/07/25 2034   BP: (!) 198/118   Pulse: 71   Resp: 16   Temp: 97 °F (36.1 °C)   TempSrc: Oral   SpO2: 100%   Weight: 79.4 kg (175 lb)   Height: 5' 5" (1.651 m)       Pertinent physical exam:  awake, alert, on EMS stretcher, hypertensive.    Brief workup plan:  labs, EKG, imaging     Preliminary workup initiated; this workup will be continued and followed by the physician or advanced practice provider that is assigned to the patient when roomed.  "

## 2025-04-08 ENCOUNTER — LAB VISIT (OUTPATIENT)
Dept: LAB | Facility: HOSPITAL | Age: 85
End: 2025-04-08
Attending: INTERNAL MEDICINE
Payer: MEDICARE

## 2025-04-08 DIAGNOSIS — E78.2 MIXED HYPERLIPIDEMIA: Primary | ICD-10-CM

## 2025-04-08 DIAGNOSIS — I10 ESSENTIAL HYPERTENSION, MALIGNANT: ICD-10-CM

## 2025-04-08 DIAGNOSIS — R06.09 DYSPNEA ON EXERTION: ICD-10-CM

## 2025-04-08 LAB
ALBUMIN SERPL-MCNC: 3.5 G/DL (ref 3.4–4.8)
ALBUMIN/GLOB SERPL: 0.9 RATIO (ref 1.1–2)
ALP SERPL-CCNC: 56 UNIT/L (ref 40–150)
ALT SERPL-CCNC: 7 UNIT/L (ref 0–55)
ANION GAP SERPL CALC-SCNC: 9 MEQ/L
AST SERPL-CCNC: 16 UNIT/L (ref 11–45)
BILIRUB SERPL-MCNC: 0.4 MG/DL
BUN SERPL-MCNC: 13.9 MG/DL (ref 9.8–20.1)
CALCIUM SERPL-MCNC: 9.8 MG/DL (ref 8.4–10.2)
CHLORIDE SERPL-SCNC: 109 MMOL/L (ref 98–107)
CHOLEST SERPL-MCNC: 196 MG/DL
CHOLEST/HDLC SERPL: 4 {RATIO} (ref 0–5)
CO2 SERPL-SCNC: 24 MMOL/L (ref 23–31)
CREAT SERPL-MCNC: 1.45 MG/DL (ref 0.55–1.02)
CREAT/UREA NIT SERPL: 10
GFR SERPLBLD CREATININE-BSD FMLA CKD-EPI: 36 ML/MIN/1.73/M2
GLOBULIN SER-MCNC: 3.9 GM/DL (ref 2.4–3.5)
GLUCOSE SERPL-MCNC: 117 MG/DL (ref 82–115)
HDLC SERPL-MCNC: 54 MG/DL (ref 35–60)
LDLC SERPL CALC-MCNC: 115 MG/DL (ref 50–140)
POTASSIUM SERPL-SCNC: 3.7 MMOL/L (ref 3.5–5.1)
PROT SERPL-MCNC: 7.4 GM/DL (ref 5.8–7.6)
SODIUM SERPL-SCNC: 142 MMOL/L (ref 136–145)
TRIGL SERPL-MCNC: 136 MG/DL (ref 37–140)
VLDLC SERPL CALC-MCNC: 27 MG/DL

## 2025-04-08 PROCEDURE — 36415 COLL VENOUS BLD VENIPUNCTURE: CPT

## 2025-04-08 PROCEDURE — 80053 COMPREHEN METABOLIC PANEL: CPT

## 2025-04-08 PROCEDURE — 80061 LIPID PANEL: CPT
